# Patient Record
Sex: FEMALE | Race: WHITE | Employment: FULL TIME | URBAN - METROPOLITAN AREA
[De-identification: names, ages, dates, MRNs, and addresses within clinical notes are randomized per-mention and may not be internally consistent; named-entity substitution may affect disease eponyms.]

---

## 2017-08-24 ENCOUNTER — HOSPITAL ENCOUNTER (OUTPATIENT)
Dept: PHYSICAL THERAPY | Age: 32
Discharge: HOME | End: 2017-08-24
Payer: COMMERCIAL

## 2017-08-24 PROCEDURE — 97161 PT EVAL LOW COMPLEX 20 MIN: CPT

## 2017-08-24 NOTE — PROGRESS NOTES
Connei Estrella  : 1985 [facility] at [address]  [phone]   [phone]         OUTPATIENT PHYSICAL THERAPY:Initial Assessment 2017    ICD-10: Treatment Diagnosis: Difficulty in walking, not elsewhere classified (R26.2)  Precautions/Allergies:   Review of patient's allergies indicates not on file. none per patient report   Fall Risk Score: 0 (? 5 = High Risk)  MD Orders: evaluate and treat MEDICAL/REFERRING DIAGNOSIS:  Mononeuropathy, unspecified [G58.9]  Muscle weakness (generalized) [M62.81]   DATE OF ONSET: 2017  REFERRING PHYSICIAN: Dr. Boston Hastings: none scheduled     ASSESSMENT (Date: 17):  Ms. Sis Lopez presents to physical therapy with an ankle injury back in April that has left her unable to bear weight through her RLE. She was recently at [facility] and they gave her a preliminary diagnosis of CRPS but they are still doing some blood work. She has full range of motion and functional strength in her RLE. She has resting pain of 2/10 that increases to 10/10 with any weight bearing. She currently uses a knee scooter to get around. She is able to get around with a walker but only bears 12# of weight through her RLE. She would benefit from skilled PT to improve her functional mobility and return to full weight bearing on her RLE. PROBLEM LIST (Impacting functional limitations):  1. Decreased ADL/Functional Activities  2. Decreased Ambulation Ability/Technique  3. Decreased Balance  4. Increased Pain  5. Decreased Activity Tolerance  6. Decreased [place] with Home Exercise Program INTERVENTIONS PLANNED:  1. Balance Exercise  2. Family Education  3. Gait Training  4. Home Exercise Program (HEP)  5. Manual Therapy  6. Neuromuscular Re-education/Strengthening  7. Therapeutic Activites  8.  Therapeutic Exercise/Strengthening   TREATMENT PLAN:  Effective Dates: 17 TO 10/24/17. Frequency/Duration: 2 times a week for 2 weeks  GOALS: (Goals have been discussed and agreed upon with patient.)  Discharge Functional Goals: Time Frame: 8 weeks  1. Patient will be compliant with HEP. 2. Patient will have a decrease on the TUG to 15 seconds in order to improve her functional mobility. 3. Patient will have an increase on the FAAM to 70 indicating a return to her PLOF. 4. Patient will demonstrate ambulating with 75% weight bearing indicating a return to normal function. Rehabilitation Potential For Stated Goals: Good  Regarding Fozia Centeno's therapy, I certify that the treatment plan above will be carried out by a therapist or under their direction. Thank you for this referral,  Shashi Aaron DPT, NCS     Referring Physician Signature: Dr. Hugo Estrada               Date                    HISTORY:   Patient Stated Goal:        I want to be back to normal   History of Present Injury/Illness (Reason for Referral):  Patient injured R foot playing kickball in April. She did therapy in [facility] but it didn't help. She did get ROM and strength back but wasn't able to ambulate. She went to [facility] clinic for 16 days. They are still running test but it may be complex regional pain syndrome. She got therapy there that was helpful. She has a rollator and a walker. She is using the walker minimal at home. Current pain 2/10.  24 hour pain level 10/10. Past Medical History/Comorbidities:   Ms. Ariadna Buckner  has no past medical history on file. Ms. Rosenthal Born  has no past surgical history on file. no significant history. Social History/Living Environment:     lives with boyfriend. 2 story home with an elevator. Prior Level of Function/Work/Activity:  Independent. Consultant, desk work.      Current Medications:  See list in paper chart    Date Last Reviewed:  8/24/2017   Number of Personal Factors/Comorbidities that affect the Plan of Care: 0: LOW COMPLEXITY   EXAMINATION: Observation/Orthostatic Postural Assessment:          Patient standing with weight shifted to the L, very minimal weight on the R.  Limited heel contact on the R, R hip elevated to keep foot off the ground. Able to stand with 12# of pressure on the R foot in static standing      Mental Status:          WFL  Palpation:          Cold to the touch, no tenderness to touch  Sensation:         Intact light tough and proprioception. Intact protective sensation   Skin Integrity:          R foot and ankle purple and cold to the touch. Vision:          functional  Special Tests:          None perfomed  Balance:          Patient able to single leg balance on L, unable to wear any functional weight on the R     Lower Extremity:   Strength PROM   Action R L R  L    Hip Flexion 4+      Hip Extension 4+      Hip Abduction 4+      Hip Adduction 4+      Knee Flexion 4+      Knee Extension 5      Dorsi Flexion 4+      Plantar Flexion 4+      Inversion 4      Eversion 4                Upper Extremity:         Grossly 5/5  Functional Mobility:         Gait/Ambulation:  With rolling walker, step through gait pattern with shortened L step length, increased UE weight bearing. Decreased heel strike and push off on the R. Transfers:  Modified independent without weight bearing through RLE        Bed Mobility:  Modified independent        Stairs:  NT        Wheelchair:  NT                Body Structures Involved:  1. Nerves  2. Joints  3. Muscles Body Functions Affected:  1. Sensory/Pain  2. Neuromusculoskeletal  3. Movement Related Activities and Participation Affected:  1. Mobility  2. Domestic Life  3. Interpersonal Interactions and Relationships  4. Community, Social and [place]   Number of elements that affect the Plan of Care: 4+: HIGH COMPLEXITY   CLINICAL PRESENTATION:   Presentation: Stable and uncomplicated: LOW COMPLEXITY   CLINICAL DECISION MAKING:   Outcome Measure:    Tool Used: PT/OT FOOT AND ANKLE ABILITY MEASURE  Score:  Initial: 12/84, 12/116 Most Recent: X (Date: -- )   Interpretation of Score: For the \"Activities of Daily Living\", there are 21 questions each scored on a 5 point scale with 0 representing \"Unable to do\" and 4 representing \"No difficulty\". The lower the score, the greater the functional disability. 84/84 represents no disability. Minimal detectable change is 5.7 points. With the addition of the 8 questions in the \"Sports Subscale,\" there are 29 questions, each scored on a 5 point scale with 0 representing \"Unable to do\" and 4 representing \"No difficulty\". The lower the score, the greater the functional disability. 116/116 represents no disability. Minimal detectable change is 12.3 points. Activities of Daily Living:  Score 84 83-68 67-51 50-34 33-18 17-1 0   Modifier CH CI CJ CK CL CM CN     Activities of Daily Living + Sports Subscale:  Score 116 115-94 93-71 70-47 46-24 23-1 0   Modifier CH CI CJ CK CL CM CN         Tool Used: Timed Up and Go (TUG)  Score:  Initial: 33.6 seconds Most Recent: X seconds (Date: -- )   Interpretation of Score: The test measures, in seconds, the time taken by an individual to stand up from a standard arm chair (seat height 46 cm [18 in], arm height 65 cm [25.6 in]), walk a distance of 3 meters (118 in, approx 10 ft), turn, walk back to the chair and sit down. If the individual takes longer than 14 seconds to complete TUG, this indicates risk for falls. Score 7 7.5-10.5 11-14 14.5-17.5 18-21 21.5-24.5 25+   Modifier CH CI CJ CK CL CM CN         Medical Necessity:   · Patient is expected to demonstrate progress in balance and functional technique to increase independence with ADLs and IADLs. · Patient demonstrates good rehab potential due to higher previous functional level. Reason for Services/Other Comments:  · Patient continues to require modification of therapeutic interventions to increase complexity of exercises.    Use of outcome tool(s) and clinical judgement create a POC that gives a: Questionable prediction of patient's progress: MODERATE COMPLEXITY   TREATMENT:   (In addition to Assessment/Re-Assessment sessions the following treatments were rendered)  Pre Treatment Symptoms: see above    Assessment only today, no treatment provided. Treatment/Session Assessment:  See assessment above. · Pain/ Symptoms: Initial:   0/10 Post Session:  0/10 ·   Compliance with Program/Exercises: Will assess as treatment progresses. · Recommendations/Intent for next treatment session: \"Next visit will focus on advancements to more challenging activities and reduction in assistance provided\".   Total Treatment Duration:PT Patient Time In/Time Out  Time In: 0930  Time Out: Democr[place] 0106, DPT

## 2017-08-25 NOTE — PROGRESS NOTES
Ambulatory/Rehab Services H2 Model Falls Risk Assessment    Risk Factor Pts. ·   Confusion/Disorientation/Impulsivity  []    4 ·   Symptomatic Depression  []   2 ·   Altered Elimination  []   1 ·   Dizziness/Vertigo  []   1 ·   Gender (Male)  []   1 ·   Any administered antiepileptics (anticonvulsants):  []   2 ·   Any administered benzodiazepines:  []   1 ·   Visual Impairment (specify):  []   1 ·   Portable Oxygen Use  []   1 ·   Orthostatic ? BP  []   1 ·   History of Recent Falls (within 3 mos.)  []   5     Ability to Rise from Chair (choose one) Pts. ·   Ability to rise in a single movement  [x]   0 ·   Pushes up, successful in one attempt  []   1 ·   Multiple attempts, but successful  []   3 ·   Unable to rise without assistance  []   4   Total: (5 or greater = High Risk) 0     Falls Prevention Plan:   []                Physical Limitations to Exercise (specify):   []                Mobility Assistance Device (type):   []                Exercise/Equipment Adaptation (specify):    ©2010 [facility] [address] [place] [address] Patent #[id number].  Federal Law prohibits the replication, distribution or use without written permission from [facility]

## 2017-08-28 ENCOUNTER — APPOINTMENT (OUTPATIENT)
Dept: PHYSICAL THERAPY | Age: 32
End: 2017-08-28
Payer: COMMERCIAL

## 2017-08-29 ENCOUNTER — HOSPITAL ENCOUNTER (OUTPATIENT)
Dept: PHYSICAL THERAPY | Age: 32
Discharge: HOME | End: 2017-08-29
Payer: COMMERCIAL

## 2017-08-29 PROCEDURE — 97110 THERAPEUTIC EXERCISES: CPT

## 2017-08-29 NOTE — PROGRESS NOTES
Connie Estrella  : 1985 [facility] at [address]  [phone]   [phone]         OUTPATIENT PHYSICAL THERAPY:Daily Note 2017    ICD-10: Treatment Diagnosis: Difficulty in walking, not elsewhere classified (R26.2)  Precautions/Allergies:   Review of patient's allergies indicates not on file. none per patient report   Fall Risk Score: 0 (? 5 = High Risk)  MD Orders: evaluate and treat MEDICAL/REFERRING DIAGNOSIS:  Mononeuropathy, unspecified [G58.9]  Muscle weakness (generalized) [M62.81]   DATE OF ONSET: 2017  REFERRING PHYSICIAN: Dr. Boston Hastings: none scheduled     ASSESSMENT (Date: 17):  Ms. Sis Lopez presents to physical therapy with an ankle injury back in April that has left her unable to bear weight through her RLE. She was recently at [facility] and they gave her a preliminary diagnosis of CRPS but they are still doing some blood work. She has full range of motion and functional strength in her RLE. She has resting pain of 2/10 that increases to 10/10 with any weight bearing. She currently uses a knee scooter to get around. She is able to get around with a walker but only bears 12# of weight through her RLE. She would benefit from skilled PT to improve her functional mobility and return to full weight bearing on her RLE. PROBLEM LIST (Impacting functional limitations):  1. Decreased ADL/Functional Activities  2. Decreased Ambulation Ability/Technique  3. Decreased Balance  4. Increased Pain  5. Decreased Activity Tolerance  6. Decreased [place] with Home Exercise Program INTERVENTIONS PLANNED:  1. Balance Exercise  2. Family Education  3. Gait Training  4. Home Exercise Program (HEP)  5. Manual Therapy  6. Neuromuscular Re-education/Strengthening  7. Therapeutic Activites  8. Therapeutic Exercise/Strengthening   TREATMENT PLAN:  Effective Dates: 17 TO 10/24/17. Frequency/Duration: 2 times a week for 2 weeks  GOALS: (Goals have been discussed and agreed upon with patient.)  Discharge Functional Goals: Time Frame: 8 weeks  1. Patient will be compliant with HEP. 2. Patient will have a decrease on the TUG to 15 seconds in order to improve her functional mobility. 3. Patient will have an increase on the FAAM to 70 indicating a return to her PLOF. 4. Patient will demonstrate ambulating with 75% weight bearing indicating a return to normal function. Rehabilitation Potential For Stated Goals: Good  Regarding Arturoangel Billskayleen Centeno's therapy, I certify that the treatment plan above will be carried out by a therapist or under their direction. Thank you for this referral,  Cindy Mendiola, ZHAOT, NCS     Referring Physician Signature: Dr. Ivory Chaudhari               Date                    HISTORY:   Patient Stated Goal:        I want to be back to normal   History of Present Injury/Illness (Reason for Referral):  Patient injured R foot playing kickball in April. She did therapy in [facility] but it didn't help. She did get ROM and strength back but wasn't able to ambulate. She went to [facility] clinic for 16 days. They are still running test but it may be complex regional pain syndrome. She got therapy there that was helpful. She has a rollator and a walker. She is using the walker minimal at home. Current pain 2/10.  24 hour pain level 10/10. Past Medical History/Comorbidities:   Ms. Maryanne De La O  has no past medical history on file. Ms. Maryanne De La O  has no past surgical history on file. no significant history. Social History/Living Environment:     lives with boyfriend. 2 story home with an elevator. Prior Level of Function/Work/Activity:  Independent. Consultant, desk work.      Current Medications:  See list in paper chart    Date Last Reviewed:  8/29/2017   Number of Personal Factors/Comorbidities that affect the Plan of Care: 0: LOW COMPLEXITY   EXAMINATION:   Observation/Orthostatic Postural Assessment:          Patient standing with weight shifted to the L, very minimal weight on the R.  Limited heel contact on the R, R hip elevated to keep foot off the ground. Able to stand with 12# of pressure on the R foot in static standing      Mental Status:          WFL  Palpation:          Cold to the touch, no tenderness to touch  Sensation:         Intact light tough and proprioception. Intact protective sensation   Skin Integrity:          R foot and ankle purple and cold to the touch. Vision:          functional  Special Tests:          None perfomed  Balance:          Patient able to single leg balance on L, unable to wear any functional weight on the R     Lower Extremity:   Strength PROM   Action R L R  L    Hip Flexion 4+      Hip Extension 4+      Hip Abduction 4+      Hip Adduction 4+      Knee Flexion 4+      Knee Extension 5      Dorsi Flexion 4+      Plantar Flexion 4+      Inversion 4      Eversion 4                Upper Extremity:         Grossly 5/5  Functional Mobility:         Gait/Ambulation:  With rolling walker, step through gait pattern with shortened L step length, increased UE weight bearing. Decreased heel strike and push off on the R. Transfers:  Modified independent without weight bearing through RLE        Bed Mobility:  Modified independent        Stairs:  NT        Wheelchair:  NT                Body Structures Involved:  1. Nerves  2. Joints  3. Muscles Body Functions Affected:  1. Sensory/Pain  2. Neuromusculoskeletal  3. Movement Related Activities and Participation Affected:  1. Mobility  2. Domestic Life  3. Interpersonal Interactions and Relationships  4. Community, Social and [place]   Number of elements that affect the Plan of Care: 4+: HIGH COMPLEXITY   CLINICAL PRESENTATION:   Presentation: Stable and uncomplicated: LOW COMPLEXITY   CLINICAL DECISION MAKING:   Outcome Measure:    Tool Used: PT/OT FOOT AND ANKLE ABILITY MEASURE  Score:  Initial: 12/84, 12/116 Most Recent: X (Date: -- )   Interpretation of Score: For the \"Activities of Daily Living\", there are 21 questions each scored on a 5 point scale with 0 representing \"Unable to do\" and 4 representing \"No difficulty\". The lower the score, the greater the functional disability. 84/84 represents no disability. Minimal detectable change is 5.7 points. With the addition of the 8 questions in the \"Sports Subscale,\" there are 29 questions, each scored on a 5 point scale with 0 representing \"Unable to do\" and 4 representing \"No difficulty\". The lower the score, the greater the functional disability. 116/116 represents no disability. Minimal detectable change is 12.3 points. Activities of Daily Living:  Score 84 83-68 67-51 50-34 33-18 17-1 0   Modifier CH CI CJ CK CL CM CN     Activities of Daily Living + Sports Subscale:  Score 116 115-94 93-71 70-47 46-24 23-1 0   Modifier CH CI CJ CK CL CM CN         Tool Used: Timed Up and Go (TUG)  Score:  Initial: 33.6 seconds Most Recent: X seconds (Date: -- )   Interpretation of Score: The test measures, in seconds, the time taken by an individual to stand up from a standard arm chair (seat height 46 cm [18 in], arm height 65 cm [25.6 in]), walk a distance of 3 meters (118 in, approx 10 ft), turn, walk back to the chair and sit down. If the individual takes longer than 14 seconds to complete TUG, this indicates risk for falls. Score 7 7.5-10.5 11-14 14.5-17.5 18-21 21.5-24.5 25+   Modifier CH CI CJ CK CL CM CN         Medical Necessity:   · Patient is expected to demonstrate progress in balance and functional technique to increase independence with ADLs and IADLs. · Patient demonstrates good rehab potential due to higher previous functional level. Reason for Services/Other Comments:  · Patient continues to require modification of therapeutic interventions to increase complexity of exercises.    Use of outcome tool(s) and clinical judgement create a POC that gives a: Questionable prediction of patient's progress: MODERATE COMPLEXITY   TREATMENT:   (In addition to Assessment/Re-Assessment sessions the following treatments were rendered)  Pre Treatment Symptoms: patient reports 1/10 pain prior to treatment. Therapeutic Exercise: ( 40 minutes):  Exercises per grid below to improve mobility, strength and balance. Required moderate visual, verbal and tactile cues to promote proper body alignment and promote proper body mechanics. Progressed complexity of movement as indicated. Date:  8/29/17 Date:   Date:     Activity/Exercise Parameters Parameters Parameters   Bike X 5 minutes level 1 with minimal push through R leg     Neuro-COM for weight bearing  Lateral weight bearing maintaining 20% holding to walker. Attempted to march LLE, weight bearing briefly jumped to 50-60% on R  A/P weight bearing while maintaining 20% on R, only able to get 40% anterior      4 way ankle R hip 2 x 15 reps yellow band     4 way hip R leg x 10 reps yellow band     Weight bearing through reverse step  X 20 reps R                         Treatment/Session Assessment:  Patient did well with exercises and was noted to be able to tolerate some increased levels of weight bearing at times. She was able to consistently bear 20% of body weight with some periods of more weight. She continues to benefit from skilled PT to improve functional mobility and balance. · Pain/ Symptoms: Initial:   1/10 Post Session:  2/10 ·   Compliance with Program/Exercises: Will assess as treatment progresses. · Recommendations/Intent for next treatment session: \"Next visit will focus on advancements to more challenging activities and reduction in assistance provided\".   Total Treatment Duration:  PT Patient Time In/Time Out  Time In: 0930  Time Out: [facility] ELISHA Holloway

## 2017-09-01 ENCOUNTER — HOSPITAL ENCOUNTER (OUTPATIENT)
Dept: PHYSICAL THERAPY | Age: 32
Discharge: HOME | End: 2017-09-01
Payer: COMMERCIAL

## 2017-09-01 PROCEDURE — 97110 THERAPEUTIC EXERCISES: CPT

## 2017-09-01 NOTE — PROGRESS NOTES
Iveth Butler  : 1985 Therapy Center at [address], [facility], [address] [facility]  [phone]   [phone]         OUTPATIENT PHYSICAL THERAPY:Daily Note 2017    ICD-10: Treatment Diagnosis: Difficulty in walking, not elsewhere classified (R26.2)  Precautions/Allergies:   Review of patient's allergies indicates not on file. none per patient report   Fall Risk Score: 0 (? 5 = High Risk)  MD Orders: evaluate and treat MEDICAL/REFERRING DIAGNOSIS:  Mononeuropathy, unspecified [G58.9]  Muscle weakness (generalized) [M62.81]   DATE OF ONSET: 2017  REFERRING PHYSICIAN: Dr. Jaynie Canavan: none scheduled     ASSESSMENT (Date: 17):  Ms. Zayda Pacheco presents to physical therapy with an ankle injury back in April that has left her unable to bear weight through her RLE. She was recently at [facility] and they gave her a preliminary diagnosis of CRPS but they are still doing some blood work. She has full range of motion and functional strength in her RLE. She has resting pain of 2/10 that increases to 10/10 with any weight bearing. She currently uses a knee scooter to get around. She is able to get around with a walker but only bears 12# of weight through her RLE. She would benefit from skilled PT to improve her functional mobility and return to full weight bearing on her RLE. PROBLEM LIST (Impacting functional limitations):  1. Decreased ADL/Functional Activities  2. Decreased Ambulation Ability/Technique  3. Decreased Balance  4. Increased Pain  5. Decreased Activity Tolerance  6. Decreased [place] with Home Exercise Program INTERVENTIONS PLANNED:  1. Balance Exercise  2. Family Education  3. Gait Training  4. Home Exercise Program (HEP)  5. Manual Therapy  6. Neuromuscular Re-education/Strengthening  7. Therapeutic Activites  8. Therapeutic Exercise/Strengthening   TREATMENT PLAN:  Effective Dates: 17 TO 10/24/17. Frequency/Duration: 2 times a week for 2 weeks  GOALS: (Goals have been discussed and agreed upon with patient.)  Discharge Functional Goals: Time Frame: 8 weeks  1. Patient will be compliant with HEP. 2. Patient will have a decrease on the TUG to 15 seconds in order to improve her functional mobility. 3. Patient will have an increase on the FAAM to 70 indicating a return to her PLOF. 4. Patient will demonstrate ambulating with 75% weight bearing indicating a return to normal function. Rehabilitation Potential For Stated Goals: Good  Regarding Rashaun Centeno's therapy, I certify that the treatment plan above will be carried out by a therapist or under their direction. Thank you for this referral,  ZHAO LeslieT, NCS     Referring Physician Signature: Dr. Cortney Jean-Baptiste               Date                    HISTORY:   Patient Stated Goal:        I want to be back to normal   History of Present Injury/Illness (Reason for Referral):  Patient injured R foot playing kickball in April. She did therapy in [facility] but it didn't help. She did get ROM and strength back but wasn't able to ambulate. She went to [facility] clinic for 16 days. They are still running test but it may be complex regional pain syndrome. She got therapy there that was helpful. She has a rollator and a walker. She is using the walker minimal at home. Current pain 2/10.  24 hour pain level 10/10. Past Medical History/Comorbidities:   Ms. Lynne Del Valle  has no past medical history on file. Ms. Lynne Del Valle  has no past surgical history on file. no significant history. Social History/Living Environment:     lives with boyfriend. 2 story home with an elevator. Prior Level of Function/Work/Activity:  Independent. Consultant, desk work.      Current Medications:  See list in paper chart    Date Last Reviewed:  9/1/2017   Number of Personal Factors/Comorbidities that affect the Plan of Care: 0: LOW COMPLEXITY   EXAMINATION:   Observation/Orthostatic Postural Assessment:          Patient standing with weight shifted to the L, very minimal weight on the R.  Limited heel contact on the R, R hip elevated to keep foot off the ground. Able to stand with 12# of pressure on the R foot in static standing      Mental Status:          WFL  Palpation:          Cold to the touch, no tenderness to touch  Sensation:         Intact light tough and proprioception. Intact protective sensation   Skin Integrity:          R foot and ankle purple and cold to the touch. Vision:          functional  Special Tests:          None perfomed  Balance:          Patient able to single leg balance on L, unable to wear any functional weight on the R     Lower Extremity:   Strength PROM   Action R L R  L    Hip Flexion 4+      Hip Extension 4+      Hip Abduction 4+      Hip Adduction 4+      Knee Flexion 4+      Knee Extension 5      Dorsi Flexion 4+      Plantar Flexion 4+      Inversion 4      Eversion 4                Upper Extremity:         Grossly 5/5  Functional Mobility:         Gait/Ambulation:  With rolling walker, step through gait pattern with shortened L step length, increased UE weight bearing. Decreased heel strike and push off on the R. Transfers:  Modified independent without weight bearing through RLE        Bed Mobility:  Modified independent        Stairs:  NT        Wheelchair:  NT                Body Structures Involved:  1. Nerves  2. Joints  3. Muscles Body Functions Affected:  1. Sensory/Pain  2. Neuromusculoskeletal  3. Movement Related Activities and Participation Affected:  1. Mobility  2. Domestic Life  3. Interpersonal Interactions and Relationships  4. Community, Social and [place]   Number of elements that affect the Plan of Care: 4+: HIGH COMPLEXITY   CLINICAL PRESENTATION:   Presentation: Stable and uncomplicated: LOW COMPLEXITY   CLINICAL DECISION MAKING:   Outcome Measure:    Tool Used: PT/OT FOOT AND ANKLE ABILITY MEASURE  Score:  Initial: 12/84, 12/116 Most Recent: X (Date: -- )   Interpretation of Score: For the \"Activities of Daily Living\", there are 21 questions each scored on a 5 point scale with 0 representing \"Unable to do\" and 4 representing \"No difficulty\". The lower the score, the greater the functional disability. 84/84 represents no disability. Minimal detectable change is 5.7 points. With the addition of the 8 questions in the \"Sports Subscale,\" there are 29 questions, each scored on a 5 point scale with 0 representing \"Unable to do\" and 4 representing \"No difficulty\". The lower the score, the greater the functional disability. 116/116 represents no disability. Minimal detectable change is 12.3 points. Activities of Daily Living:  Score 84 83-68 67-51 50-34 33-18 17-1 0   Modifier CH CI CJ CK CL CM CN     Activities of Daily Living + Sports Subscale:  Score 116 115-94 93-71 70-47 46-24 23-1 0   Modifier CH CI CJ CK CL CM CN         Tool Used: Timed Up and Go (TUG)  Score:  Initial: 33.6 seconds Most Recent: X seconds (Date: -- )   Interpretation of Score: The test measures, in seconds, the time taken by an individual to stand up from a standard arm chair (seat height 46 cm [18 in], arm height 65 cm [25.6 in]), walk a distance of 3 meters (118 in, approx 10 ft), turn, walk back to the chair and sit down. If the individual takes longer than 14 seconds to complete TUG, this indicates risk for falls. Score 7 7.5-10.5 11-14 14.5-17.5 18-21 21.5-24.5 25+   Modifier CH CI CJ CK CL CM CN         Medical Necessity:   · Patient is expected to demonstrate progress in balance and functional technique to increase independence with ADLs and IADLs. · Patient demonstrates good rehab potential due to higher previous functional level. Reason for Services/Other Comments:  · Patient continues to require modification of therapeutic interventions to increase complexity of exercises.    Use of outcome tool(s) and clinical judgement create a POC that gives a: Questionable prediction of patient's progress: MODERATE COMPLEXITY   TREATMENT:   (In addition to Assessment/Re-Assessment sessions the following treatments were rendered)  Pre Treatment Symptoms: patient reports 1/10 pain prior to treatment. She reports she was finally given a diagnosis. 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 She is going to return to [facility] in November for a surgery consultation. She reports that the MD told her not to be aggressive with therapy because he doesn't want her brain to be trained that walking is painful. Therapeutic Exercise: ( 40 minutes):  Exercises per grid below to improve mobility, strength and balance. Required moderate visual, verbal and tactile cues to promote proper body alignment and promote proper body mechanics. Progressed complexity of movement as indicated. Date:  8/29/17 Date:  9/1/17 Date:     Activity/Exercise Parameters Parameters Parameters   Bike X 5 minutes level 1 with minimal push through R leg  X 8 minutes level 1    Neuro-COM for weight bearing  Lateral weight bearing maintaining 20% holding to walker. Attempted to march LLE, weight bearing briefly jumped to 50-60% on R  A/P weight bearing while maintaining 20% on R, only able to get 40% anterior      4 way ankle R hip 2 x 15 reps yellow band     4 way hip R leg x 10 reps yellow band     Weight bearing through reverse step  X 20 reps R                 9/1/17: All exercises x 10 reps     Supine hip flexion  Prone hip extension  Sidelying hip abduction  Bridges  Clams   Seated HS curl with green band  Seated LAQ with green band  Standing HS curl  gastroc stretch on wall x 30 sec  Soleus stretch on wall x 30 sec      Treatment/Session Assessment:  Patient was educated on a detailed HEP to address ankle/knee/hip strength and ROM as patient awaits to have surgery.   The patient reported that the MD said not to be aggressive with PT. Therefore, patient will work on her HEP and return to therapy if needed. She was also educated on how to advance her gait and her exercises when ready. Patient and boyfriend verbalized understanding. · Pain/ Symptoms: Initial:   1/10 Post Session:  1/10 ·   Compliance with Program/Exercises: Will assess as treatment progresses. · Recommendations/Intent for next treatment session: \"Next visit will focus on advancements to more challenging activities and reduction in assistance provided\".   Total Treatment Duration:  PT Patient Time In/Time Out  Time In: 0910  Time Out: Marco Antonio Vanegas 94, DPT

## 2017-09-06 ENCOUNTER — APPOINTMENT (OUTPATIENT)
Dept: PHYSICAL THERAPY | Age: 32
End: 2017-09-06
Payer: COMMERCIAL

## 2017-09-08 ENCOUNTER — APPOINTMENT (OUTPATIENT)
Dept: PHYSICAL THERAPY | Age: 32
End: 2017-09-08
Payer: COMMERCIAL

## 2017-10-11 ENCOUNTER — HOSPITAL ENCOUNTER (OUTPATIENT)
Dept: PHYSICAL THERAPY | Age: 32
Discharge: HOME | End: 2017-10-11

## 2017-10-11 NOTE — PROGRESS NOTES
Pipo Nguyen  : 1985  Payor: Sophie Oscar / Plan: [place] [facility] / Product Type: PPO /  [facility] [facility], [place] [address] [address] [facility]  [phone]   [phone]        OUTPATIENT DAILY NOTE    NAME/AGE/GENDER: Pipo Nguyen is a 34 y.o. female. DATE: 10/11/2017    Patient showed for appointment today with questions regarding gait training with walking cast and home exercise program. Ambulated with rolling walker and crutches this date. She notes decreased pain in right foot with crutches but decreased feeling of stability. Educated on stair negotiation with use of crutches. Educated on progression of hip strengthening exercises. She verbalized and demonstrated understanding of gait training and home exercise program. No charge this date.      Yolie Salgado, PT

## 2018-01-02 ENCOUNTER — HOSPITAL ENCOUNTER (OUTPATIENT)
Dept: PHYSICAL THERAPY | Age: 33
Discharge: HOME | End: 2018-01-02
Payer: COMMERCIAL

## 2018-01-02 PROCEDURE — 97162 PT EVAL MOD COMPLEX 30 MIN: CPT

## 2018-01-02 PROCEDURE — 97110 THERAPEUTIC EXERCISES: CPT

## 2018-01-02 NOTE — THERAPY EVALUATION
Cuate Vargas  : 1985 Cuate Vargas  : 1985  Payor: Dirk List / [place] [employer] [address] / Product Type: PPO /  [address] [place]  at [facility]  [address], [address], [place] [address] [address] [facility]  [phone]   [phone]              OUTPATIENT PHYSICAL THERAPY:Initial Assessment 1/3/2018    ICD-10: Treatment Diagnosis: pain in right foot (M79.671)  Difficulty in walking, not elsewhere classified (R26.2)  Precautions/Allergies:   None per Cuate Vargas   Fall Risk Score: 1 (? 5 = High Risk)  MD Orders: Evaluate and treat MEDICAL/REFERRING DIAGNOSIS:  Pain, foot, right, chronic [M79.671, G89.29]  DATE OF ONSET: date of surgery 17  REFERRING PHYSICIAN: Jeb Crystal MD; Bhaskar Zepeda MD  RETURN PHYSICIAN APPOINTMENT: none schedule at this time   Patient has attended 1 physical therapy session including initial evaluation. INITIAL ASSESSMENT:  Ms. Shaina Daly presents with increased pain right foot and difficulty walking status post tarsal tunnel decompression on 17 with the [facility]. She had an ankle injury in 2017 that left her unable to bear weight through her right lower extremity. [facility] gave her a preliminary diagnosis of CRPS but then performed tarsal tunnel decompression right. She is cleared for weight bearing as tolerated status post procedure. She currently uses a knee scooter to get around. She holds her foot in dorsiflexed and inverted position. She does not like to touch her foot to floor. She presents with decreased range of motion right foot and ankle. She presents with decreased independence with functional mobility and activities of daily living. She would benefit from skilled physical therapy in order to restore prior level of function and prevent future impairment. PROBLEM LIST (Impacting functional limitations):  1. Decreased Strength  2. Decreased ADL/Functional Activities  3.  Decreased Transfer Abilities  4. Decreased Ambulation Ability/Technique  5. Decreased Balance  6. Increased Pain  7. Decreased Activity Tolerance  8. Decreased Pacing Skills  9. Decreased Work Simplification/Energy Conservation Techniques  10. Decreased Flexibility/Joint Mobility  11. Edema/Girth  12. Decreased [place] with Home Exercise Program INTERVENTIONS PLANNED:  1. Balance Exercise  2. Cold  3. Family Education  4. Gait Training  5. Heat  6. Home Exercise Program (HEP)  7. Manual Therapy  8. Neuromuscular Re-education/Strengthening  9. Range of Motion (ROM)  10. Therapeutic Activites  11. Therapeutic Exercise/Strengthening  12. Transcutaneous Electrical Nerve Stimulation (TENS)  13. Transfer Training  14. Ultrasound (US)  15. orthotic management and training   TREATMENT PLAN:  Effective Dates: 1/2/18 TO 4/2/18. Frequency/Duration: 2 times a week for 8 weeks  GOALS: (Goals have been discussed and agreed upon with patient.)  Discharge Goals: Time Frame: 8 weeks  1. Patient will be independent with home exercise program within 8 weeks in order to improve independence with management of patient's symptoms and/or functional deficits. 2. Patient will improve their foot and ankle ability measure score to 20/84 within 8 weeks in order to improve pain free independence with functional mobility. 3. Patient will ambulate with equal weight bearing with use of least restrictive device within 8 weeks in order to improve pain free independence with functional mobility. Rehabilitation Potential For Stated Goals: Good  Regarding Alice Centeno's therapy, I certify that the treatment plan above will be carried out by a therapist or under their direction. Thank you for this referral,  Kelsi Cazares PT     Referring Physician Signature:               Date                    The information in this section was collected on 1/2/18 (except where otherwise noted).   HISTORY:   History of Present Injury/Illness (Reason for Referral):  Ms. Geoff Elena presents with increased pain right foot and difficulty walking status post tarsal tunnel decompression on 12/7/17 with the [facility]. She had an ankle injury in April 2017 that left her unable to bear weight through her right lower extremity. [facility] gave her a preliminary diagnosis of CRPS but then performed tarsal tunnel decompression right. She is cleared for weight bearing as tolerated status post procedure. She currently uses a knee scooter to get around. She holds her foot in dorsiflexed and inverted position. She does not like to touch her foot to floor. 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 Current Medications: vuvanse sonata, birth control, advil, tylenol    Date Last Reviewed:  1/3/2018   Number of Personal Factors/Comorbidities that affect the Plan of Care: 1-2: MODERATE COMPLEXITY   EXAMINATION:   Observation/Orthostatic Postural Assessment:  Assessed 1/2/18 Jeremy Garcia sits with forward head and rounded shoulders which indicate tight anterior chest musculature, upper trapezius, and levator scapula and weak posterior scapula musculature and deep cervical flexors. She holds her foot in dorsiflexed and inverted position.  She does not touch foot to floor when sitting  Palpation:  Assessed 1/2/18         Jeremy Garcia notes tenderness with palpation right plantar fascia, decreased myofascial mobility right foot/ankle and surrounding incision, increased edema right ankle/foot  ROM:  Assessed 1/2/18  Left ankle dorsiflexion active range of motion with knee flexed: 10 degrees  Left ankle dorsiflexion active range of motion with knee extended: 10 degrees  Left ankle dorsiflexion passive range of motion with knee extended: 20 degrees  Left ankle plantarflexion active range of motion: 35 degrees    Right ankle dorsiflexion active range of motion with knee flexed: 10 degrees  Rightt ankle dorsiflexion active range of motion with knee extended: 10 degrees  Right ankle dorsiflexion passive range of motion with knee extended: 12 degrees  Rightt ankle plantarflexion active range of motion: 30 degrees  At least 30 degrees of ankle inversion/eversion passive range of motion    Right first ray dorsiflexed with hypomobility plantarflexion   Strength:  Assessed 1/2/18         At least 3/5 strength right ankle - will continue to assess as appropriate  4+/5 left ankle and lower extremity all major muscle groups  Special Tests:    Neurological Screen: Assessed 1/2/18        Myotomes:  Strong left lower extremity, weak right ankle/foot due to recent procedure        Dermatomes: Altered sensation right foot/ankle        Reflexes:    Functional Mobility: Assessed 1/2/18 Marcos Viveros is modified independent with functional mobility and activities of daily living with use of scooter, she notes she has tried to shift weight to right lower extremity with use of rolling walker but has difficulty  Balance:  Assessed 1/2/17         Poor standing dynamic balance - fair standing dynamic balance with use of scooter  Mental Status:  Assessed 1/2/17        Alert and oriented x 4   Body Structures Involved:  1. Nerves  2. Bones  3. Joints  4. Muscles  5. Ligaments Body Functions Affected:  1. Sensory/Pain  2. Neuromusculoskeletal  3. Movement Related  4. Skin Related Activities and Participation Affected:  1. General Tasks and Demands  2. Mobility  3. Self Care  4. Domestic Life  5.  Interpersonal Interactions and Relationships  6. Community, Social and [place]   Number of elements that affect the Plan of Care: 3: MODERATE COMPLEXITY   CLINICAL PRESENTATION:   Presentation: Evolving clinical presentation with changing clinical characteristics: MODERATE COMPLEXITY   CLINICAL DECISION MAKING:   Outcome Measure: Assessed 1/2/18  Tool Used: PT/OT FOOT AND ANKLE ABILITY MEASURE  Score:  Initial: 1/84    Interpretation of Score: For the \"Activities of Daily Living\", there are 21 questions each scored on a 5 point scale with 0 representing \"Unable to do\" and 4 representing \"No difficulty\". The lower the score, the greater the functional disability. 84/84 represents no disability. Minimal detectable change is 5.7 points. With the addition of the 8 questions in the \"Sports Subscale,\" there are 29 questions, each scored on a 5 point scale with 0 representing \"Unable to do\" and 4 representing \"No difficulty\". The lower the score, the greater the functional disability. 116/116 represents no disability. Minimal detectable change is 12.3 points. Activities of Daily Living:  Score 84 83-68 67-51 50-34 33-18 17-1 0   Modifier CH CI CJ CK CL CM CN     Medical Necessity:   · Patient is expected to demonstrate progress in strength, range of motion, balance, coordination and functional technique to increase independence with pain free functional mobility. · Patient demonstrates good rehab potential due to higher previous functional level. Reason for Services/Other Comments:  · Patient continues to require skilled intervention due to decreased independence with functional mobility.    Use of outcome tool(s) and clinical judgement create a POC that gives a: Questionable prediction of patient's progress: MODERATE COMPLEXITY            TREATMENT:   (In addition to Assessment/Re-Assessment sessions the following treatments were rendered)  Pre-treatment Symptoms/Complaints:  Abiola Roldan notes 2/10 pain at rest right foot, she notes pain increases to 8/10 if she attempts to bear weight right lower extremity   Pain: Initial:   Pain Intensity 1: 2  Pain Location 1: Ankle, Foot  Pain Orientation 1: Right      Evaluation: 31 minutes    (In addition to Assessment/Re-Assessment sessions the following treatments were rendered)  Therapeutic Exercise: (14 Minutes):  Exercises per grid below to improve mobility, strength and coordination. Required minimal visual and verbal cues to promote proper body alignment and promote proper body posture. Progressed range and complexity of movement as indicated. 1. Gentle active range of motion right for improved mobility and strength - ABC, dorsiflexion/plantarflexion, inversion/eversion, circles     2. Gentle passive dorsiflexion stretching with towel with foot inverted to reduce tibial nerve tension 3 sets 30 seconds right    3. Tibial nerve gliding technique with foot plantarflexed and inverted and short arc quads 20 repetitions right     4. Gentle weight bearing dorsiflexion stretch in sitting with toes on towel roll and foot inverted 3 sets 30 seconds right     Manual Therapy (     ):   Therapeutic Modalities:                                                                                                 Treatment/Session Assessment:    Response to Treatment:  Jr Hawkins tolerated initiation of home exercise program well this date with no increase in pain  Pain: Post Treatment Session: 2/10  · Compliance with Program/Exercises: Will assess as treatment progresses. · Recommendations/Intent for next treatment session: \"Next visit will focus on advancements to more challenging activities and aquatic therapy for improved weight bearing right lower extremity\".   Total Treatment Duration:  PT Patient Time In/Time Out  Time In: 1100  Time Out: Elias Zhang PT

## 2018-01-02 NOTE — PROGRESS NOTES
Ambulatory/Rehab Services H2 Model Falls Risk Assessment    Risk Factor Pts. ·   Confusion/Disorientation/Impulsivity  []    4 ·   Symptomatic Depression  []   2 ·   Altered Elimination  []   1 ·   Dizziness/Vertigo  []   1 ·   Gender (Male)  []   1 ·   Any administered antiepileptics (anticonvulsants):  []   2 ·   Any administered benzodiazepines:  []   1 ·   Visual Impairment (specify):  []   1 ·   Portable Oxygen Use  []   1 ·   Orthostatic ? BP  []   1 ·   History of Recent Falls (within 3 mos.)  []   5     Ability to Rise from Chair (choose one) Pts. ·   Ability to rise in a single movement  []   0 ·   Pushes up, successful in one attempt  [x]   1 ·   Multiple attempts, but successful  []   3 ·   Unable to rise without assistance  []   4   Total: (5 or greater = High Risk) 1     Falls Prevention Plan:   []                Physical Limitations to Exercise (specify):   []                Mobility Assistance Device (type):   []                Exercise/Equipment Adaptation (specify):    ©2010 [facility] [place] [address] [place] [address] Patent #[id number].  Federal Law prohibits the replication, distribution or use without written permission from [facility] of [address] [place] [address]  1/2/2018

## 2018-01-02 NOTE — PROGRESS NOTES
Tiesha Valdez  : 1985 [facility] [place] [address] [address] [facility]  [phone]   [phone]         OUTPATIENT PHYSICAL THERAPY:Discontinuation Summary 2018    ICD-10: Treatment Diagnosis: Difficulty in walking, not elsewhere classified (R26.2)  Precautions/Allergies:   Review of patient's allergies indicates not on file. none per patient report   Fall Risk Score: 0 (? 5 = High Risk)  MD Orders: evaluate and treat MEDICAL/REFERRING DIAGNOSIS:  Mononeuropathy, unspecified [G58.9]  Muscle weakness (generalized) [M62.81]   DATE OF ONSET: 2017  REFERRING PHYSICIAN: Dr. Petar Avila: none scheduled     ASSESSMENT (Date: ):  Tiesha Valdez has been seen in physical therapy from 17 to 17 for 3 visits. Treatment has been discontinued at this time due to patient doing an HEP and not needing to return to therapy. The below goals were met prior to discontinuation. Some goals were not met due to not being reassessed. Thank you for this referral.          PROBLEM LIST (Impacting functional limitations):  1. Decreased ADL/Functional Activities  2. Decreased Ambulation Ability/Technique  3. Decreased Balance  4. Increased Pain  5. Decreased Activity Tolerance  6. Decreased [place] with Home Exercise Program INTERVENTIONS PLANNED:  1. Balance Exercise  2. Family Education  3. Gait Training  4. Home Exercise Program (HEP)  5. Manual Therapy  6. Neuromuscular Re-education/Strengthening  7. Therapeutic Activites  8. Therapeutic Exercise/Strengthening   TREATMENT PLAN:  Effective Dates: 17 TO 10/24/17. Frequency/Duration: 2 times a week for 2 weeks  GOALS: (Goals have been discussed and agreed upon with patient.)  Discharge Functional Goals: Time Frame: 8 weeks  1. Patient will be compliant with HEP. MET  2.  Patient will have a decrease on the TUG to 15 seconds in order to improve her functional mobility. NOT ASSESSED  3. Patient will have an increase on the FAAM to 70 indicating a return to her PLOF. NOT ASSESSED  4. Patient will demonstrate ambulating with 75% weight bearing indicating a return to normal function. NOT ASSESSED  Rehabilitation Potential For Stated Goals: Good  Regarding Yamile Centeno's therapy, I certify that the treatment plan above will be carried out by a therapist or under their direction.   Thank you for this referral,  Diallo Ivy, ELISHA, NCS

## 2018-01-03 PROBLEM — F90.9 ATTENTION DEFICIT HYPERACTIVITY DISORDER (ADHD): Status: ACTIVE | Noted: 2018-01-03

## 2018-01-03 PROBLEM — G47.00 INSOMNIA: Status: ACTIVE | Noted: 2018-01-03

## 2018-01-03 PROBLEM — R03.0 WHITE COAT SYNDROME WITHOUT DIAGNOSIS OF HYPERTENSION: Status: ACTIVE | Noted: 2018-01-03

## 2018-01-03 PROBLEM — G57.51 TARSAL TUNNEL SYNDROME OF RIGHT SIDE: Status: ACTIVE | Noted: 2018-01-03

## 2018-01-04 ENCOUNTER — HOSPITAL ENCOUNTER (OUTPATIENT)
Dept: PHYSICAL THERAPY | Age: 33
Discharge: HOME | End: 2018-01-04
Payer: COMMERCIAL

## 2018-01-04 PROCEDURE — 97113 AQUATIC THERAPY/EXERCISES: CPT

## 2018-01-04 NOTE — PROGRESS NOTES
Anna Sleight  : 1985 Anna Sleight  : 1985  Payor: Aravind Perez / Plan: [place] BLUE CROSS OF [address] / Product Type: PPO /  [address]  at [facility]  [address] [facility], [address]  [phone]   [phone]              OUTPATIENT PHYSICAL THERAPY:Daily Note and Aquatic Note 2018    ICD-10: Treatment Diagnosis: pain in right foot (M79.671)  Difficulty in walking, not elsewhere classified (R26.2)  Precautions/Allergies:   None per Anna Caty   Fall Risk Score: 1 (? 5 = High Risk)  MD Orders: Evaluate and treat MEDICAL/REFERRING DIAGNOSIS:  Pain, foot, right, chronic [M79.671, G89.29]  DATE OF ONSET: date of surgery 17  REFERRING PHYSICIAN: Tomasa Charles MD; Jah Patel MD  RETURN PHYSICIAN APPOINTMENT: none schedule at this time   Patient has attended 1 physical therapy session including initial evaluation. INITIAL ASSESSMENT:  Ms. Tahmina Vega presents with increased pain right foot and difficulty walking status post tarsal tunnel decompression on 17 with the [facility]. She had an ankle injury in 2017 that left her unable to bear weight through her right lower extremity. [facility] gave her a preliminary diagnosis of CRPS but then performed tarsal tunnel decompression right. She is cleared for weight bearing as tolerated status post procedure. She currently uses a knee scooter to get around. She holds her foot in dorsiflexed and inverted position. She does not like to touch her foot to floor. She presents with decreased range of motion right foot and ankle. She presents with decreased independence with functional mobility and activities of daily living. She would benefit from skilled physical therapy in order to restore prior level of function and prevent future impairment. PROBLEM LIST (Impacting functional limitations):  1. Decreased Strength  2. Decreased ADL/Functional Activities  3.  Decreased Transfer Abilities  4. Decreased Ambulation Ability/Technique  5. Decreased Balance  6. Increased Pain  7. Decreased Activity Tolerance  8. Decreased Pacing Skills  9. Decreased Work Simplification/Energy Conservation Techniques  10. Decreased Flexibility/Joint Mobility  11. Edema/Girth  12. Decreased [place] with Home Exercise Program INTERVENTIONS PLANNED:  1. Balance Exercise  2. Cold  3. Family Education  4. Gait Training  5. Heat  6. Home Exercise Program (HEP)  7. Manual Therapy  8. Neuromuscular Re-education/Strengthening  9. Range of Motion (ROM)  10. Therapeutic Activites  11. Therapeutic Exercise/Strengthening  12. Transcutaneous Electrical Nerve Stimulation (TENS)  13. Transfer Training  14. Ultrasound (US)  15. orthotic management and training   TREATMENT PLAN:  Effective Dates: 1/2/18 TO 4/2/18. Frequency/Duration: 2 times a week for 8 weeks  GOALS: (Goals have been discussed and agreed upon with patient.)  Discharge Goals: Time Frame: 8 weeks  1. Patient will be independent with home exercise program within 8 weeks in order to improve independence with management of patient's symptoms and/or functional deficits. 2. Patient will improve their foot and ankle ability measure score to 20/84 within 8 weeks in order to improve pain free independence with functional mobility. 3. Patient will ambulate with equal weight bearing with use of least restrictive device within 8 weeks in order to improve pain free independence with functional mobility. Rehabilitation Potential For Stated Goals: Good  Regarding [facility]'s therapy, I certify that the treatment plan above will be carried out by a therapist or under their direction. Thank you for this referral,  Arik Altamirano PTA     Referring Physician Signature:               Date                    The information in this section was collected on 1/2/18 (except where otherwise noted).   HISTORY:   History of Present Injury/Illness (Reason for Referral):  Ms. Rere Hogan presents with increased pain right foot and difficulty walking status post tarsal tunnel decompression on 12/7/17 with the [facility]. She had an ankle injury in April 2017 that left her unable to bear weight through her right lower extremity. [facility] gave her a preliminary diagnosis of CRPS but then performed tarsal tunnel decompression right. She is cleared for weight bearing as tolerated status post procedure. She currently uses a knee scooter to get around. She holds her foot in dorsiflexed and inverted position. She does not like to touch her foot to floor. 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 Current Medications: vuvanse sonata, birth control, advil, tylenol    Date Last Reviewed:  1/4/2018   Number of Personal Factors/Comorbidities that affect the Plan of Care: 1-2: MODERATE COMPLEXITY   EXAMINATION:   Observation/Orthostatic Postural Assessment:  Assessed 1/2/18 Mark More sits with forward head and rounded shoulders which indicate tight anterior chest musculature, upper trapezius, and levator scapula and weak posterior scapula musculature and deep cervical flexors. She holds her foot in dorsiflexed and inverted position.  She does not touch foot to floor when sitting  Palpation:  Assessed 1/2/18         Mark More notes tenderness with palpation right plantar fascia, decreased myofascial mobility right foot/ankle and surrounding incision, increased edema right ankle/foot  ROM:  Assessed 1/2/18  Left ankle dorsiflexion active range of motion with knee flexed: 10 degrees  Left ankle dorsiflexion active range of motion with knee extended: 10 degrees  Left ankle dorsiflexion passive range of motion with knee extended: 20 degrees  Left ankle plantarflexion active range of motion: 35 degrees    Right ankle dorsiflexion active range of motion with knee flexed: 10 degrees  Rightt ankle dorsiflexion active range of motion with knee extended: 10 degrees  Right ankle dorsiflexion passive range of motion with knee extended: 12 degrees  Rightt ankle plantarflexion active range of motion: 30 degrees  At least 30 degrees of ankle inversion/eversion passive range of motion    Right first ray dorsiflexed with hypomobility plantarflexion   Strength:  Assessed 1/2/18         At least 3/5 strength right ankle - will continue to assess as appropriate  4+/5 left ankle and lower extremity all major muscle groups  Special Tests:    Neurological Screen: Assessed 1/2/18        Myotomes:  Strong left lower extremity, weak right ankle/foot due to recent procedure        Dermatomes: Altered sensation right foot/ankle        Reflexes:    Functional Mobility: Assessed 1/2/18 Cece Streeter is modified independent with functional mobility and activities of daily living with use of scooter, she notes she has tried to shift weight to right lower extremity with use of rolling walker but has difficulty  Balance:  Assessed 1/2/17         Poor standing dynamic balance - fair standing dynamic balance with use of scooter  Mental Status:  Assessed 1/2/17        Alert and oriented x 4   Body Structures Involved:  1. Nerves  2. Bones  3. Joints  4. Muscles  5. Ligaments Body Functions Affected:  1. Sensory/Pain  2. Neuromusculoskeletal  3. Movement Related  4. Skin Related Activities and Participation Affected:  1. General Tasks and Demands  2. Mobility  3. Self Care  4. Domestic Life  5.  Interpersonal Interactions and Relationships  6. Community, Social and [place]   Number of elements that affect the Plan of Care: 3: MODERATE COMPLEXITY   CLINICAL PRESENTATION:   Presentation: Evolving clinical presentation with changing clinical characteristics: MODERATE COMPLEXITY   CLINICAL DECISION MAKING:   Outcome Measure: Assessed 1/2/18  Tool Used: PT/OT FOOT AND ANKLE ABILITY MEASURE  Score:  Initial: 1/84    Interpretation of Score: For the \"Activities of Daily Living\", there are 21 questions each scored on a 5 point scale with 0 representing \"Unable to do\" and 4 representing \"No difficulty\". The lower the score, the greater the functional disability. 84/84 represents no disability. Minimal detectable change is 5.7 points. With the addition of the 8 questions in the \"Sports Subscale,\" there are 29 questions, each scored on a 5 point scale with 0 representing \"Unable to do\" and 4 representing \"No difficulty\". The lower the score, the greater the functional disability. 116/116 represents no disability. Minimal detectable change is 12.3 points. Activities of Daily Living:  Score 84 83-68 67-51 50-34 33-18 17-1 0   Modifier CH CI CJ CK CL CM CN     Medical Necessity:   · Patient is expected to demonstrate progress in strength, range of motion, balance, coordination and functional technique to increase independence with pain free functional mobility. · Patient demonstrates good rehab potential due to higher previous functional level. Reason for Services/Other Comments:  · Patient continues to require skilled intervention due to decreased independence with functional mobility.    Use of outcome tool(s) and clinical judgement create a POC that gives a: Questionable prediction of patient's progress: MODERATE COMPLEXITY            TREATMENT:   (In addition to Assessment/Re-Assessment sessions the following treatments were rendered)  Pre-treatment Symptoms/Complaints:  Ada Bernal notes 2/10 pain at rest in the right foot. Patient presents with scooter. Pain: Initial:     2/10 at rest      Aquatic Therapy ( 50  minutes): Aquatic treatment performed per flow grid for Decreased muscle strength, Decreased endurance and Decreased activity endurance. Cues provided for alignment and weight bearing. .  Assistance by therapist provided for patient to be able to weight bear on R LE. Aquatic Exercise Log       Date  1-4-18 Date   Date   Date   Date     Activity/ Exercise Parameters Parameters Parameters Parameters Parameters   Walking forward 2 laps with rail and therapist        Walking backward        Walking sideways In deeper water x 1 lap         Marching          Goose Step          Tip toes          Heels          Lunges        Side step squats        LE Exercises Small noodle          Hip Flex/Ext Marching x 10 B         Hip Abd/Add          Hip IR/ER          Calf raises          Knee Flex No noodle x 10 B         Squats          Leg Circles Deeper water x 10 each way each leg         Step Ups Small step x 10 B       UE Exercises          Squeeze In          Push Down          Pull Down          Bicep/Tricep        Rows/Press outs         Chi Positions          Trunk Rotation        Deep H2O/ Noodles noodle         Stabilization Core stability         Arms only          Legs only Bicycle- 2 laps       Cross   Country X 25          Scissors X 25  Combo x 20          Crab walk Seated on noodle in shallow - walking forward, backwards, and side stepping x 2 laps each        Lower abdominal   work  Knees to chest x 10          Cardio          Jogging        Lap   Swimming          Stretches On bench  *          Hamstrings X 3 B         Heelcords X 3 B         Piriformis X 3 B       R foot  By therapist                    * For increased soft tissue extensibility a warm water (over 100°F) environment was utilized. Supervision/monitoring was provided at all times.         Treatment/Session Assessment:  Patient was assisted out of pool at edge by boyfriend. I encouraged her to place her foot on the ground. Response to Treatment:  Umberto Martino tolerated initiation of home exercise program well this date with no increase in pain  Pain: Post Treatment Session: 4-5/10  · Compliance with Program/Exercises: Will assess as treatment progresses. · Recommendations/Intent for next treatment session: \"Next visit will focus on advancements to more challenging activities and aquatic therapy for improved weight bearing right lower extremity\".   Total Treatment Duration: 50 minutes   PT Patient Time In/Time Out  Time In: 0090  Time Out: [address] [place], [place]

## 2018-01-09 ENCOUNTER — HOSPITAL ENCOUNTER (OUTPATIENT)
Dept: PHYSICAL THERAPY | Age: 33
Discharge: HOME | End: 2018-01-09
Payer: COMMERCIAL

## 2018-01-09 PROCEDURE — 97110 THERAPEUTIC EXERCISES: CPT

## 2018-01-09 PROCEDURE — 97140 MANUAL THERAPY 1/> REGIONS: CPT

## 2018-01-09 NOTE — PROGRESS NOTES
Marbella Barahona  : 1985 Marbella Barahona  : 1985  Payor: Julissa Liner / Plan: [facility] / Product Type: PPO /  [id number]  at [facility]  [address], [address], [address]  [phone]   [phone]              OUTPATIENT PHYSICAL THERAPY:Initial Assessment 2018    ICD-10: Treatment Diagnosis: pain in right foot (M79.671)  Difficulty in walking, not elsewhere classified (R26.2)  Precautions/Allergies:   None per Marbella Barahona   Fall Risk Score: 1 (? 5 = High Risk)  MD Orders: Evaluate and treat MEDICAL/REFERRING DIAGNOSIS:  Pain, foot, right, chronic [M79.671, G89.29]  DATE OF ONSET: date of surgery 17  REFERRING PHYSICIAN: Brooklyn Rasheed MD; Solomon Azevedo MD  RETURN PHYSICIAN APPOINTMENT: none schedule at this time   Patient has attended 1 physical therapy session including initial evaluation. INITIAL ASSESSMENT:  Ms. Homa Ventura presents with increased pain right foot and difficulty walking status post tarsal tunnel decompression on 17 with the [facility]. She had an ankle injury in 2017 that left her unable to bear weight through her right lower extremity. [facility] gave her a preliminary diagnosis of CRPS but then performed tarsal tunnel decompression right. She is cleared for weight bearing as tolerated status post procedure. She currently uses a knee scooter to get around. She holds her foot in dorsiflexed and inverted position. She does not like to touch her foot to floor. She presents with decreased range of motion right foot and ankle. She presents with decreased independence with functional mobility and activities of daily living. She would benefit from skilled physical therapy in order to restore prior level of function and prevent future impairment. PROBLEM LIST (Impacting functional limitations):  1. Decreased Strength  2. Decreased ADL/Functional Activities  3.  Decreased Transfer Abilities  4. Decreased Ambulation Ability/Technique  5. Decreased Balance  6. Increased Pain  7. Decreased Activity Tolerance  8. Decreased Pacing Skills  9. Decreased Work Simplification/Energy Conservation Techniques  10. Decreased Flexibility/Joint Mobility  11. Edema/Girth  12. Decreased [place] with Home Exercise Program INTERVENTIONS PLANNED:  1. Balance Exercise  2. Cold  3. Family Education  4. Gait Training  5. Heat  6. Home Exercise Program (HEP)  7. Manual Therapy  8. Neuromuscular Re-education/Strengthening  9. Range of Motion (ROM)  10. Therapeutic Activites  11. Therapeutic Exercise/Strengthening  12. Transcutaneous Electrical Nerve Stimulation (TENS)  13. Transfer Training  14. Ultrasound (US)  15. orthotic management and training   TREATMENT PLAN:  Effective Dates: 1/2/18 TO 4/2/18. Frequency/Duration: 2 times a week for 8 weeks  GOALS: (Goals have been discussed and agreed upon with patient.)  Discharge Goals: Time Frame: 8 weeks  1. Patient will be independent with home exercise program within 8 weeks in order to improve independence with management of patient's symptoms and/or functional deficits. 2. Patient will improve their foot and ankle ability measure score to 20/84 within 8 weeks in order to improve pain free independence with functional mobility. 3. Patient will ambulate with equal weight bearing with use of least restrictive device within 8 weeks in order to improve pain free independence with functional mobility. Rehabilitation Potential For Stated Goals: Good              The information in this section was collected on 1/2/18 (except where otherwise noted). HISTORY:   History of Present Injury/Illness (Reason for Referral):  Ms. Peter Muro presents with increased pain right foot and difficulty walking status post tarsal tunnel decompression on 12/7/17 with the [facility].  She had an ankle injury in April 2017 that left her unable to bear weight through her right lower extremity. [address] gave her a preliminary diagnosis of CRPS but then performed tarsal tunnel decompression right. She is cleared for weight bearing as tolerated status post procedure. She currently uses a knee scooter to get around. She holds her foot in dorsiflexed and inverted position. She does not like to touch her foot to floor. 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 Current Medications: vuvanse sonata, birth control, advil, tylenol    Date Last Reviewed:  1/9/2018   Number of Personal Factors/Comorbidities that affect the Plan of Care: 1-2: MODERATE COMPLEXITY   EXAMINATION:   Observation/Orthostatic Postural Assessment:  Assessed 1/2/18 Rohini Vyas sits with forward head and rounded shoulders which indicate tight anterior chest musculature, upper trapezius, and levator scapula and weak posterior scapula musculature and deep cervical flexors. She holds her foot in dorsiflexed and inverted position.  She does not touch foot to floor when sitting  Palpation:  Assessed 1/2/18         Rohini Vyas notes tenderness with palpation right plantar fascia, decreased myofascial mobility right foot/ankle and surrounding incision, increased edema right ankle/foot  ROM:  Assessed 1/2/18  Left ankle dorsiflexion active range of motion with knee flexed: 10 degrees  Left ankle dorsiflexion active range of motion with knee extended: 10 degrees  Left ankle dorsiflexion passive range of motion with knee extended: 20 degrees  Left ankle plantarflexion active range of motion: 35 degrees    Right ankle dorsiflexion active range of motion with knee flexed: 10 degrees  Rightt ankle dorsiflexion active range of motion with knee extended: 10 degrees  Right ankle dorsiflexion passive range of motion with knee extended: 12 degrees  Rightt ankle plantarflexion active range of motion: 30 degrees  At least 30 degrees of ankle inversion/eversion passive range of motion    Right first ray dorsiflexed with hypomobility plantarflexion   Strength:  Assessed 1/2/18         At least 3/5 strength right ankle - will continue to assess as appropriate  4+/5 left ankle and lower extremity all major muscle groups  Special Tests:    Neurological Screen: Assessed 1/2/18        Myotomes:  Strong left lower extremity, weak right ankle/foot due to recent procedure        Dermatomes: Altered sensation right foot/ankle        Reflexes:    Functional Mobility: Assessed 1/2/18 Marella Lefort is modified independent with functional mobility and activities of daily living with use of scooter, she notes she has tried to shift weight to right lower extremity with use of rolling walker but has difficulty  Balance:  Assessed 1/2/17         Poor standing dynamic balance - fair standing dynamic balance with use of scooter  Mental Status:  Assessed 1/2/17        Alert and oriented x 4   Body Structures Involved:  1. Nerves  2. Bones  3. Joints  4. Muscles  5. Ligaments Body Functions Affected:  1. Sensory/Pain  2. Neuromusculoskeletal  3. Movement Related  4. Skin Related Activities and Participation Affected:  1. General Tasks and Demands  2. Mobility  3. Self Care  4. Domestic Life  5. Interpersonal Interactions and Relationships  6.  Community, Social and [place]   Number of elements that affect the Plan of Care: 3: MODERATE COMPLEXITY   CLINICAL PRESENTATION:   Presentation: Evolving clinical presentation with changing clinical characteristics: MODERATE COMPLEXITY   CLINICAL DECISION MAKING:   Outcome Measure: Assessed 1/2/18  Tool Used: PT/OT FOOT AND ANKLE ABILITY MEASURE  Score:  Initial: 1/84    Interpretation of Score: For the \"Activities of Daily Living\", there are 21 questions each scored on a 5 point scale with 0 representing \"Unable to do\" and 4 representing \"No difficulty\". The lower the score, the greater the functional disability. 84/84 represents no disability. Minimal detectable change is 5.7 points. With the addition of the 8 questions in the \"Sports Subscale,\" there are 29 questions, each scored on a 5 point scale with 0 representing \"Unable to do\" and 4 representing \"No difficulty\". The lower the score, the greater the functional disability. 116/116 represents no disability. Minimal detectable change is 12.3 points. Activities of Daily Living:  Score 84 83-68 67-51 50-34 33-18 17-1 0   Modifier CH CI CJ CK CL CM CN     Medical Necessity:   · Patient is expected to demonstrate progress in strength, range of motion, balance, coordination and functional technique to increase independence with pain free functional mobility. · Patient demonstrates good rehab potential due to higher previous functional level. Reason for Services/Other Comments:  · Patient continues to require skilled intervention due to decreased independence with functional mobility.    Use of outcome tool(s) and clinical judgement create a POC that gives a: Questionable prediction of patient's progress: MODERATE COMPLEXITY            TREATMENT:   (In addition to Assessment/Re-Assessment sessions the following treatments were rendered)  Pre-treatment Symptoms/Complaints:  Ada Bernal notes 3/10 pain in right ankle and foot - she notes she enjoyed aquatic therapy and did not have too much increase in pain following session   Pain: Initial:   Pain Intensity 1: 3  Pain Location 1: Foot, Ankle  Pain Orientation 1: Right  Pain Intervention(s) 1: Exercise, Therapeutic touch, Cold pack      (In addition to Assessment/Re-Assessment sessions the following treatments were rendered)  Therapeutic Exercise: (16 Minutes):  Exercises per grid below to improve mobility, strength and coordination. Required minimal visual and verbal cues to promote proper body alignment and promote proper body posture. Progressed range and complexity of movement as indicated. 1. Gentle active range of motion right for improved mobility and strength - ABC, dorsiflexion/plantarflexion, inversion/eversion, circles     2. Gentle passive dorsiflexion stretching with towel with foot inverted to reduce tibial nerve tension 3 sets 30 seconds right    3. Tibial nerve gliding technique with foot plantarflexed and inverted and short arc quads 20 repetitions right     4. Gentle weight bearing dorsiflexion stretch in sitting with toes on towel roll and foot inverted 3 sets 30 seconds right     5. NuStep for close chain dynamic mobility right ankle/foot level 1 8 minutes     Manual Therapy (    Soft Tissue Mobilization Duration  Duration: 25 Minutes): Myofascial release to right ankle/foot for improved mobility, muscle relaxation and decreased pain   Cross friction massage to right ankle incision to prevent adhesions   First ray plantarflexion/dorsiflexion mobilization for improved mobility   Plantar fascia cross friction massage for improved mobility     Therapeutic Modalities: for decreased pain and inflammation                       Right Ankle Cold  Type: Cold/ice pack  Duration : 10 minutes  Patient Position: Supine                                                                         Treatment/Session Assessment:    Response to Treatment:  Cecy Goetz with no change in pain level - decreased sensitivity right foot with manual therapy this date  Pain: Post Treatment Session: 3/10  · Compliance with Program/Exercises: Will assess as treatment progresses.   · Recommendations/Intent for next treatment session: \"Next visit will focus on advancements to more challenging activities and aquatic therapy for improved weight bearing right lower extremity\".   Total Treatment Duration:  PT Patient Time In/Time Out  Time In: 1100  Time Out: [address], PT

## 2018-01-11 ENCOUNTER — HOSPITAL ENCOUNTER (OUTPATIENT)
Dept: PHYSICAL THERAPY | Age: 33
Discharge: HOME | End: 2018-01-11
Payer: COMMERCIAL

## 2018-01-11 PROCEDURE — 97113 AQUATIC THERAPY/EXERCISES: CPT

## 2018-01-11 NOTE — PROGRESS NOTES
Jarocho Schilder  : 1985 Jarocho Schilder  : 1985  Payor: Amira Orozco / Plan: [facility] / Product Type: PPO /  [address]  at   [address], [address], [place] [address] [address] [facility]  [phone]   [phone]              OUTPATIENT PHYSICAL THERAPY:Daily Note and Aquatic Note 2018    ICD-10: Treatment Diagnosis: pain in right foot (M79.671)  Difficulty in walking, not elsewhere classified (R26.2)  Precautions/Allergies:   None per Dessie Schilder   Fall Risk Score: 1 (? 5 = High Risk)  MD Orders: Evaluate and treat MEDICAL/REFERRING DIAGNOSIS:  Pain, foot, right, chronic [M79.671, G89.29]  DATE OF ONSET: date of surgery 17  REFERRING PHYSICIAN: Hong Guerra MD; Win Hightower MD  RETURN PHYSICIAN APPOINTMENT: none schedule at this time   Patient has attended 1 physical therapy session including initial evaluation. INITIAL ASSESSMENT:  Ms. Kristen Jaquez presents with increased pain right foot and difficulty walking status post tarsal tunnel decompression on 17 with the [facility]. She had an ankle injury in 2017 that left her unable to bear weight through her right lower extremity. [facility] gave her a preliminary diagnosis of CRPS but then performed tarsal tunnel decompression right. She is cleared for weight bearing as tolerated status post procedure. She currently uses a knee scooter to get around. She holds her foot in dorsiflexed and inverted position. She does not like to touch her foot to floor. She presents with decreased range of motion right foot and ankle. She presents with decreased independence with functional mobility and activities of daily living. She would benefit from skilled physical therapy in order to restore prior level of function and prevent future impairment. PROBLEM LIST (Impacting functional limitations):  1. Decreased Strength  2. Decreased ADL/Functional Activities  3.  Decreased Transfer Abilities  4. Decreased Ambulation Ability/Technique  5. Decreased Balance  6. Increased Pain  7. Decreased Activity Tolerance  8. Decreased Pacing Skills  9. Decreased Work Simplification/Energy Conservation Techniques  10. Decreased Flexibility/Joint Mobility  11. Edema/Girth  12. Decreased [place] with Home Exercise Program INTERVENTIONS PLANNED:  1. Balance Exercise  2. Cold  3. Family Education  4. Gait Training  5. Heat  6. Home Exercise Program (HEP)  7. Manual Therapy  8. Neuromuscular Re-education/Strengthening  9. Range of Motion (ROM)  10. Therapeutic Activites  11. Therapeutic Exercise/Strengthening  12. Transcutaneous Electrical Nerve Stimulation (TENS)  13. Transfer Training  14. Ultrasound (US)  15. orthotic management and training   TREATMENT PLAN:  Effective Dates: 1/2/18 TO 4/2/18. Frequency/Duration: 2 times a week for 8 weeks  GOALS: (Goals have been discussed and agreed upon with patient.)  Discharge Goals: Time Frame: 8 weeks  1. Patient will be independent with home exercise program within 8 weeks in order to improve independence with management of patient's symptoms and/or functional deficits. 2. Patient will improve their foot and ankle ability measure score to 20/84 within 8 weeks in order to improve pain free independence with functional mobility. 3. Patient will ambulate with equal weight bearing with use of least restrictive device within 8 weeks in order to improve pain free independence with functional mobility. Rehabilitation Potential For Stated Goals: Good  Regarding Leslie Centeno's therapy, I certify that the treatment plan above will be carried out by a therapist or under their direction. Thank you for this referral,  Samantha Mcfarlane PTA     Referring Physician Signature:               Date                    The information in this section was collected on 1/2/18 (except where otherwise noted).   HISTORY:   History of Present Injury/Illness (Reason for Referral):  Ms. Jovanna Ha presents with increased pain right foot and difficulty walking status post tarsal tunnel decompression on 12/7/17 with the [facility]. She had an ankle injury in April 2017 that left her unable to bear weight through her right lower extremity. [facility] gave her a preliminary diagnosis of CRPS but then performed tarsal tunnel decompression right. She is cleared for weight bearing as tolerated status post procedure. She currently uses a knee scooter to get around. She holds her foot in dorsiflexed and inverted position. She does not like to touch her foot to floor. 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 Current Medications: vuvanse sonata, birth control, advil, tylenol    Date Last Reviewed:  1/11/2018   Number of Personal Factors/Comorbidities that affect the Plan of Care: 1-2: MODERATE COMPLEXITY   EXAMINATION:   Observation/Orthostatic Postural Assessment:  Assessed 1/2/18 Ada Bernal sits with forward head and rounded shoulders which indicate tight anterior chest musculature, upper trapezius, and levator scapula and weak posterior scapula musculature and deep cervical flexors. She holds her foot in dorsiflexed and inverted position.  She does not touch foot to floor when sitting  Palpation:  Assessed 1/2/18         Ada Bernal notes tenderness with palpation right plantar fascia, decreased myofascial mobility right foot/ankle and surrounding incision, increased edema right ankle/foot  ROM:  Assessed 1/2/18  Left ankle dorsiflexion active range of motion with knee flexed: 10 degrees  Left ankle dorsiflexion active range of motion with knee extended: 10 degrees  Left ankle dorsiflexion passive range of motion with knee extended: 20 degrees  Left ankle plantarflexion active range of motion: 35 degrees    Right ankle dorsiflexion active range of motion with knee flexed: 10 degrees  Rightt ankle dorsiflexion active range of motion with knee extended: 10 degrees  Right ankle dorsiflexion passive range of motion with knee extended: 12 degrees  Rightt ankle plantarflexion active range of motion: 30 degrees  At least 30 degrees of ankle inversion/eversion passive range of motion    Right first ray dorsiflexed with hypomobility plantarflexion   Strength:  Assessed 1/2/18         At least 3/5 strength right ankle - will continue to assess as appropriate  4+/5 left ankle and lower extremity all major muscle groups  Special Tests:    Neurological Screen: Assessed 1/2/18        Myotomes:  Strong left lower extremity, weak right ankle/foot due to recent procedure        Dermatomes: Altered sensation right foot/ankle        Reflexes:    Functional Mobility: Assessed 1/2/18 Pecolia Hashimoto is modified independent with functional mobility and activities of daily living with use of scooter, she notes she has tried to shift weight to right lower extremity with use of rolling walker but has difficulty  Balance:  Assessed 1/2/17         Poor standing dynamic balance - fair standing dynamic balance with use of scooter  Mental Status:  Assessed 1/2/17        Alert and oriented x 4   Body Structures Involved:  1. Nerves  2. Bones  3. Joints  4. Muscles  5. Ligaments Body Functions Affected:  1. Sensory/Pain  2. Neuromusculoskeletal  3. Movement Related  4. Skin Related Activities and Participation Affected:  1. General Tasks and Demands  2. Mobility  3. Self Care  4. Domestic Life  5.  Interpersonal Interactions and Relationships  6. Community, Social and [place]   Number of elements that affect the Plan of Care: 3: MODERATE COMPLEXITY   CLINICAL PRESENTATION:   Presentation: Evolving clinical presentation with changing clinical characteristics: MODERATE COMPLEXITY   CLINICAL DECISION MAKING:   Outcome Measure: Assessed 1/2/18  Tool Used: PT/OT FOOT AND ANKLE ABILITY MEASURE  Score:  Initial: 1/84    Interpretation of Score: For the \"Activities of Daily Living\", there are 21 questions each scored on a 5 point scale with 0 representing \"Unable to do\" and 4 representing \"No difficulty\". The lower the score, the greater the functional disability. 84/84 represents no disability. Minimal detectable change is 5.7 points. With the addition of the 8 questions in the \"Sports Subscale,\" there are 29 questions, each scored on a 5 point scale with 0 representing \"Unable to do\" and 4 representing \"No difficulty\". The lower the score, the greater the functional disability. 116/116 represents no disability. Minimal detectable change is 12.3 points. Activities of Daily Living:  Score 84 83-68 67-51 50-34 33-18 17-1 0   Modifier CH CI CJ CK CL CM CN     Medical Necessity:   · Patient is expected to demonstrate progress in strength, range of motion, balance, coordination and functional technique to increase independence with pain free functional mobility. · Patient demonstrates good rehab potential due to higher previous functional level. Reason for Services/Other Comments:  · Patient continues to require skilled intervention due to decreased independence with functional mobility.    Use of outcome tool(s) and clinical judgement create a POC that gives a: Questionable prediction of patient's progress: MODERATE COMPLEXITY            TREATMENT:   (In addition to Assessment/Re-Assessment sessions the following treatments were rendered)  Pre-treatment Symptoms/Complaints:  Cece Streeter notes 2/10 pain at rest in the right foot and 4-5/10 with exercise. Patient presents with father and scooter. Pain: Initial:     2/10 at rest      Aquatic Therapy ( 45  minutes): Aquatic treatment performed per flow grid for Decreased muscle strength, Decreased endurance and Decreased activity endurance. Cues provided for alignment and weight bearing. .  Assistance by therapist provided for patient to be able to weight bear on R LE.     Aquatic Exercise Log       Date  1-4-18 Date  1-11-18 Date   Date   Date     Activity/ Exercise Parameters Parameters Parameters Parameters Parameters   Walking forward 2 laps with rail and therapist  Seated on noodle - pulling forward x 2 laps      Walking backward  Seated on noodle - pushing backward x 2 laps      Walking sideways In deeper water x 1 lap Seated on noodle - each direction x 2 laps        Marching          Goose Step          Tip toes          Heels          Lunges        Side step squats        LE Exercises Small noodle          Hip Flex/Ext Marching x 10 B         Hip Abd/Add          Hip IR/ER          Leg press  Just getting foot on wall and rocking back and forth - 5 min        Knee Flex No noodle x 10 B         Squats          Leg Circles Deeper water x 10 each way each leg Deeper water x 10 each way each leg        Step Ups Small step x 10 B Small step   X 10 B      UE Exercises          Squeeze In          Push Down          Pull Down          Bicep/Tricep        Rows/Press outs         Chi Positions          Trunk Rotation        Deep H2O/ Noodles noodle Noodle and rings        Stabilization Core stability Cor stability        Arms only  Straddle - 2 laps        Legs only Bicycle- 2 laps Straddle - 2 laps   Both - 2 laps      Cross   Country X 25  X 25        Scissors X 25  Combo x 20  X 25  Combo x 20        Crab walk Seated on noodle in shallow - walking forward, backwards, and side stepping x 2 laps each        Lower abdominal   work  Knees to chest x 10  Knees to chest    x 10 Cardio          Jogging  Noodle under R foot rolling arch to touch toes then heel  X 8      Lap   Swimming          Stretches On bench  On bench           Hamstrings X 3 B X 3 B        Heelcords X 3 B X 3 B        Piriformis X 3 B X 3 B      R foot  By therapist    By therapist                  h      Treatment/Session Assessment:  Patient was assisted out of pool at edge by father. No other complaints. Focused more on range and putting foot on ground today. Response to Treatment:  Umberto Martino tolerated aquatic session. Encouraged more movement today in foot. Pain: Post Treatment Session: 5/10  · Compliance with Program/Exercises: Will assess as treatment progresses. · Recommendations/Intent for next treatment session: \"Next visit will focus on advancements to more challenging activities and aquatic therapy for improved weight bearing right lower extremity\".   Total Treatment Duration:   PT Patient Time In/Time Out  Time In: 1130  Time Out: [address] [facility] ARLETTE Lugo

## 2018-01-16 ENCOUNTER — APPOINTMENT (OUTPATIENT)
Dept: PHYSICAL THERAPY | Age: 33
End: 2018-01-16
Payer: COMMERCIAL

## 2018-01-16 ENCOUNTER — HOSPITAL ENCOUNTER (OUTPATIENT)
Dept: PHYSICAL THERAPY | Age: 33
Discharge: HOME | End: 2018-01-16
Payer: COMMERCIAL

## 2018-01-16 PROCEDURE — 97113 AQUATIC THERAPY/EXERCISES: CPT

## 2018-01-16 NOTE — PROGRESS NOTES
Cherl Alba  : 1985 Cherl Alba  : 1985  Payor: Marietta Michele / Plan: [facility] / Product Type: PPO /  [id number]  at [facility]  [address], [address], [place] [address] [facility]  [phone]   [phone]              OUTPATIENT PHYSICAL THERAPY:Daily Note and Aquatic Note 2018    ICD-10: Treatment Diagnosis: pain in right foot (M79.671)  Difficulty in walking, not elsewhere classified (R26.2)  Precautions/Allergies:   None per Enriqueta Dutton   Fall Risk Score: 1 (? 5 = High Risk)  MD Orders: Evaluate and treat MEDICAL/REFERRING DIAGNOSIS:  Pain, foot, right, chronic [M79.671, G89.29]  DATE OF ONSET: date of surgery 17  REFERRING PHYSICIAN: Christie Lara MD; Marcia Faustin MD  RETURN PHYSICIAN APPOINTMENT: none schedule at this time   Patient has attended 1 physical therapy session including initial evaluation. INITIAL ASSESSMENT:  Ms. Shon Wilkins presents with increased pain right foot and difficulty walking status post tarsal tunnel decompression on 17 with the [facility]. She had an ankle injury in 2017 that left her unable to bear weight through her right lower extremity. [facility] gave her a preliminary diagnosis of CRPS but then performed tarsal tunnel decompression right. She is cleared for weight bearing as tolerated status post procedure. She currently uses a knee scooter to get around. She holds her foot in dorsiflexed and inverted position. She does not like to touch her foot to floor. She presents with decreased range of motion right foot and ankle. She presents with decreased independence with functional mobility and activities of daily living. She would benefit from skilled physical therapy in order to restore prior level of function and prevent future impairment. PROBLEM LIST (Impacting functional limitations):  1. Decreased Strength  2. Decreased ADL/Functional Activities  3.  Decreased Transfer Abilities  4. Decreased Ambulation Ability/Technique  5. Decreased Balance  6. Increased Pain  7. Decreased Activity Tolerance  8. Decreased Pacing Skills  9. Decreased Work Simplification/Energy Conservation Techniques  10. Decreased Flexibility/Joint Mobility  11. Edema/Girth  12. Decreased [place] with Home Exercise Program INTERVENTIONS PLANNED:  1. Balance Exercise  2. Cold  3. Family Education  4. Gait Training  5. Heat  6. Home Exercise Program (HEP)  7. Manual Therapy  8. Neuromuscular Re-education/Strengthening  9. Range of Motion (ROM)  10. Therapeutic Activites  11. Therapeutic Exercise/Strengthening  12. Transcutaneous Electrical Nerve Stimulation (TENS)  13. Transfer Training  14. Ultrasound (US)  15. orthotic management and training   TREATMENT PLAN:  Effective Dates: 1/2/18 TO 4/2/18. Frequency/Duration: 2 times a week for 8 weeks  GOALS: (Goals have been discussed and agreed upon with patient.)  Discharge Goals: Time Frame: 8 weeks  1. Patient will be independent with home exercise program within 8 weeks in order to improve independence with management of patient's symptoms and/or functional deficits. 2. Patient will improve their foot and ankle ability measure score to 20/84 within 8 weeks in order to improve pain free independence with functional mobility. 3. Patient will ambulate with equal weight bearing with use of least restrictive device within 8 weeks in order to improve pain free independence with functional mobility. Rehabilitation Potential For Stated Goals: Good  Regarding Gypsy Centeno's therapy, I certify that the treatment plan above will be carried out by a therapist or under their direction. Thank you for this referral,  Karoline Guzman PTA     Referring Physician Signature:               Date                    The information in this section was collected on 1/2/18 (except where otherwise noted).   HISTORY:   History of Present Injury/Illness (Reason for Referral):  Ms. Reza Mcmahan presents with increased pain right foot and difficulty walking status post tarsal tunnel decompression on 12/7/17 with the [facility]. She had an ankle injury in April 2017 that left her unable to bear weight through her right lower extremity. [facility] gave her a preliminary diagnosis of CRPS but then performed tarsal tunnel decompression right. She is cleared for weight bearing as tolerated status post procedure. She currently uses a knee scooter to get around. She holds her foot in dorsiflexed and inverted position. She does not like to touch her foot to floor. 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 Current Medications: vuvanse sonata, birth control, advil, tylenol    Date Last Reviewed:  1/16/2018   Number of Personal Factors/Comorbidities that affect the Plan of Care: 1-2: MODERATE COMPLEXITY   EXAMINATION:   Observation/Orthostatic Postural Assessment:  Assessed 1/2/18 Cecy Goetz sits with forward head and rounded shoulders which indicate tight anterior chest musculature, upper trapezius, and levator scapula and weak posterior scapula musculature and deep cervical flexors. She holds her foot in dorsiflexed and inverted position.  She does not touch foot to floor when sitting  Palpation:  Assessed 1/2/18         Cecy Goetz notes tenderness with palpation right plantar fascia, decreased myofascial mobility right foot/ankle and surrounding incision, increased edema right ankle/foot  ROM:  Assessed 1/2/18  Left ankle dorsiflexion active range of motion with knee flexed: 10 degrees  Left ankle dorsiflexion active range of motion with knee extended: 10 degrees  Left ankle dorsiflexion passive range of motion with knee extended: 20 degrees  Left ankle plantarflexion active range of motion: 35 degrees    Right ankle dorsiflexion active range of motion with knee flexed: 10 degrees  Rightt ankle dorsiflexion active range of motion with knee extended: 10 degrees  Right ankle dorsiflexion passive range of motion with knee extended: 12 degrees  Rightt ankle plantarflexion active range of motion: 30 degrees  At least 30 degrees of ankle inversion/eversion passive range of motion    Right first ray dorsiflexed with hypomobility plantarflexion   Strength:  Assessed 1/2/18         At least 3/5 strength right ankle - will continue to assess as appropriate  4+/5 left ankle and lower extremity all major muscle groups  Special Tests:    Neurological Screen: Assessed 1/2/18        Myotomes:  Strong left lower extremity, weak right ankle/foot due to recent procedure        Dermatomes: Altered sensation right foot/ankle        Reflexes:    Functional Mobility: Assessed 1/2/18 Marcos Viveros is modified independent with functional mobility and activities of daily living with use of scooter, she notes she has tried to shift weight to right lower extremity with use of rolling walker but has difficulty  Balance:  Assessed 1/2/17         Poor standing dynamic balance - fair standing dynamic balance with use of scooter  Mental Status:  Assessed 1/2/17        Alert and oriented x 4   Body Structures Involved:  1. Nerves  2. Bones  3. Joints  4. Muscles  5. Ligaments Body Functions Affected:  1. Sensory/Pain  2. Neuromusculoskeletal  3. Movement Related  4. Skin Related Activities and Participation Affected:  1. General Tasks and Demands  2. Mobility  3. Self Care  4. Domestic Life  5.  Interpersonal Interactions and Relationships  6. Community, Social and [place]   Number of elements that affect the Plan of Care: 3: MODERATE COMPLEXITY   CLINICAL PRESENTATION:   Presentation: Evolving clinical presentation with changing clinical characteristics: MODERATE COMPLEXITY   CLINICAL DECISION MAKING:   Outcome Measure: Assessed 1/2/18  Tool Used: PT/OT FOOT AND ANKLE ABILITY MEASURE  Score:  Initial: 1/84    Interpretation of Score: For the \"Activities of Daily Living\", there are 21 questions each scored on a 5 point scale with 0 representing \"Unable to do\" and 4 representing \"No difficulty\". The lower the score, the greater the functional disability. 84/84 represents no disability. Minimal detectable change is 5.7 points. With the addition of the 8 questions in the \"Sports Subscale,\" there are 29 questions, each scored on a 5 point scale with 0 representing \"Unable to do\" and 4 representing \"No difficulty\". The lower the score, the greater the functional disability. 116/116 represents no disability. Minimal detectable change is 12.3 points. Activities of Daily Living:  Score 84 83-68 67-51 50-34 33-18 17-1 0   Modifier CH CI CJ CK CL CM CN     Medical Necessity:   · Patient is expected to demonstrate progress in strength, range of motion, balance, coordination and functional technique to increase independence with pain free functional mobility. · Patient demonstrates good rehab potential due to higher previous functional level. Reason for Services/Other Comments:  · Patient continues to require skilled intervention due to decreased independence with functional mobility. Use of outcome tool(s) and clinical judgement create a POC that gives a: Questionable prediction of patient's progress: MODERATE COMPLEXITY            TREATMENT:   (In addition to Assessment/Re-Assessment sessions the following treatments were rendered)  Pre-treatment Symptoms/Complaints:  Rivka Pittman notes 3/10 pain at rest in R foot. She presents on scooter, and even when changing does not put foot on floor until therapist gives verbal cue to place foot on floor. She reports she is doing some things at home, but not everything. Pain: Initial:     3/10 at rest      Aquatic Therapy ( 45  minutes): Aquatic treatment performed per flow grid for Decreased muscle strength, Decreased endurance and Decreased activity endurance. Cues provided for alignment and weight bearing. .  Assistance by therapist provided for patient to be able to weight bear on R LE.     Aquatic Exercise Log       Date  1-4-18 Date  1-11-18 Date  1-16-18 Date   Date     Activity/ Exercise Parameters Parameters Parameters Parameters Parameters   Walking forward 2 laps with rail and therapist  Seated on noodle - pulling forward x 2 laps Seated on noodle - 4 laps     Walking backward  Seated on noodle - pushing backward x 2 laps Seated on noodle - 4 laps     Walking sideways In deeper water x 1 lap Seated on noodle - each direction x 2 laps Seated on noodle - wide knees and close knees- 2 laps each        Marching   Standing in 4.5 feet  Tapping R foot to big step and back 2 x 10        Goose Step          Tip toes          Heels          Lunges        Side step squats        LE Exercises Small noodle   noodle       Hip Flex/Ext Marching x 10 B  Marching x 15 B       Hip Abd/Add   X 15 B       Hip IR/ER          Leg press  Just getting foot on wall and rocking back and forth - 5 min        Knee Flex No noodle x 10 B         Squats          Leg Circles Deeper water x 10 each way each leg Deeper water x 10 each way each leg Deeper water x 10 each way each leg       Step Ups Small step x 10 B Small step   X 10 B      UE Exercises          Squeeze In          Push Down          Pull Down          Bicep/Tricep        Rows/Press outs         Chi Positions          Trunk Rotation        Deep H2O/ Noodles noodle Noodle and rings Noodle and rings       Stabilization Core stability Cor stability        Arms only  Straddle - 2 laps        Legs only Bicycle- 2 laps Straddle - 2 laps   Both - 2 laps Arms and legs - straddle - 6 laps     Cross   Country X 25  X 25 2 minutes       Scissors X 25  Combo x 20  X 25  Combo x 20 2 minutes  Combo - 2 minutes       Crab walk Seated on noodle in shallow - walking forward, backwards, and side stepping x 2 laps each   4 laps     Lower abdominal   work  Knees to chest x 10  Knees to chest    x 10 Knees to chest x 10        Cardio          Jogging  Noodle under R foot rolling arch to touch toes then heel  X 8      Lap   Swimming          Stretches On bench  On bench  On bench          Hamstrings X 3 B X 3 B X 3 B       Heelcords X 3 B X 3 B X 3 B       Piriformis X 3 B X 3 B X 3 B     R foot  By therapist    By therapist By therapist                   Treatment/Session Assessment:  Patient was assisted out of pool at edge by father at bench end, so no steps used to exit pool. No other complaints. Focused on endurance and range today. Response to Treatment:  Rivka Toya tolerated aquatic session. Encouraged more movement today in foot. Pain: Post Treatment Session: 5-6/10  · Compliance with Program/Exercises: Will assess as treatment progresses. · Recommendations/Intent for next treatment session: \"Next visit will focus on advancements to more challenging activities and aquatic therapy for improved weight bearing right lower extremity\".   Total Treatment Duration:   PT Patient Time In/Time Out  Time In: 1045  Time Out: [address], PTA

## 2018-01-17 ENCOUNTER — APPOINTMENT (OUTPATIENT)
Dept: PHYSICAL THERAPY | Age: 33
End: 2018-01-17
Payer: COMMERCIAL

## 2018-01-18 ENCOUNTER — HOSPITAL ENCOUNTER (OUTPATIENT)
Dept: PHYSICAL THERAPY | Age: 33
Discharge: HOME | End: 2018-01-18
Payer: COMMERCIAL

## 2018-01-18 PROCEDURE — 97113 AQUATIC THERAPY/EXERCISES: CPT

## 2018-01-18 NOTE — PROGRESS NOTES
Whitewatermaurilio Trent  : 1985 Whitewatermaurilio Trent  : 1985  Payor: Graciela Daily / Plan: [place] [facility] / Product Type: PPO /  [address] at [address] [facility], [address] [facility], [address]  [phone]   [phone]              OUTPATIENT PHYSICAL THERAPY:Daily Note and Aquatic Note 2018    ICD-10: Treatment Diagnosis: pain in right foot (M79.671)  Difficulty in walking, not elsewhere classified (R26.2)  Precautions/Allergies:   None per Blake Trent   Fall Risk Score: 1 (? 5 = High Risk)  MD Orders: Evaluate and treat MEDICAL/REFERRING DIAGNOSIS:  Pain, foot, right, chronic [M79.671, G89.29]  DATE OF ONSET: date of surgery 17  REFERRING PHYSICIAN: Nicolette Vazquez MD; Regine Dominguez MD  RETURN PHYSICIAN APPOINTMENT: none schedule at this time   Patient has attended 1 physical therapy session including initial evaluation. INITIAL ASSESSMENT:  Ms. Richard Willis presents with increased pain right foot and difficulty walking status post tarsal tunnel decompression on 17 with the [facility]. She had an ankle injury in 2017 that left her unable to bear weight through her right lower extremity. [facility] gave her a preliminary diagnosis of CRPS but then performed tarsal tunnel decompression right. She is cleared for weight bearing as tolerated status post procedure. She currently uses a knee scooter to get around. She holds her foot in dorsiflexed and inverted position. She does not like to touch her foot to floor. She presents with decreased range of motion right foot and ankle. She presents with decreased independence with functional mobility and activities of daily living. She would benefit from skilled physical therapy in order to restore prior level of function and prevent future impairment. PROBLEM LIST (Impacting functional limitations):  1. Decreased Strength  2. Decreased ADL/Functional Activities  3.  Decreased Transfer Abilities  4. Decreased Ambulation Ability/Technique  5. Decreased Balance  6. Increased Pain  7. Decreased Activity Tolerance  8. Decreased Pacing Skills  9. Decreased Work Simplification/Energy Conservation Techniques  10. Decreased Flexibility/Joint Mobility  11. Edema/Girth  12. Decreased [place] with Home Exercise Program INTERVENTIONS PLANNED:  1. Balance Exercise  2. Cold  3. Family Education  4. Gait Training  5. Heat  6. Home Exercise Program (HEP)  7. Manual Therapy  8. Neuromuscular Re-education/Strengthening  9. Range of Motion (ROM)  10. Therapeutic Activites  11. Therapeutic Exercise/Strengthening  12. Transcutaneous Electrical Nerve Stimulation (TENS)  13. Transfer Training  14. Ultrasound (US)  15. orthotic management and training   TREATMENT PLAN:  Effective Dates: 1/2/18 TO 4/2/18. Frequency/Duration: 2 times a week for 8 weeks  GOALS: (Goals have been discussed and agreed upon with patient.)  Discharge Goals: Time Frame: 8 weeks  1. Patient will be independent with home exercise program within 8 weeks in order to improve independence with management of patient's symptoms and/or functional deficits. 2. Patient will improve their foot and ankle ability measure score to 20/84 within 8 weeks in order to improve pain free independence with functional mobility. 3. Patient will ambulate with equal weight bearing with use of least restrictive device within 8 weeks in order to improve pain free independence with functional mobility. Rehabilitation Potential For Stated Goals: Good  Regarding Dolly Centeno's therapy, I certify that the treatment plan above will be carried out by a therapist or under their direction. Thank you for this referral,  Rip Son, PTA     Referring Physician Signature:               Date                    The information in this section was collected on 1/2/18 (except where otherwise noted).   HISTORY:   History of Present Injury/Illness (Reason for Referral):  Ms. Reza Mcmahan presents with increased pain right foot and difficulty walking status post tarsal tunnel decompression on 12/7/17 with the [facility]. She had an ankle injury in April 2017 that left her unable to bear weight through her right lower extremity. [facility] gave her a preliminary diagnosis of CRPS but then performed tarsal tunnel decompression right. She is cleared for weight bearing as tolerated status post procedure. She currently uses a knee scooter to get around. She holds her foot in dorsiflexed and inverted position. She does not like to touch her foot to floor. 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 Current Medications: vuvanse sonata, birth control, advil, tylenol    Date Last Reviewed:  1/18/2018   Number of Personal Factors/Comorbidities that affect the Plan of Care: 1-2: MODERATE COMPLEXITY   EXAMINATION:   Observation/Orthostatic Postural Assessment:  Assessed 1/2/18 Cecy Goetz sits with forward head and rounded shoulders which indicate tight anterior chest musculature, upper trapezius, and levator scapula and weak posterior scapula musculature and deep cervical flexors. She holds her foot in dorsiflexed and inverted position.  She does not touch foot to floor when sitting  Palpation:  Assessed 1/2/18         Cecy Goetz notes tenderness with palpation right plantar fascia, decreased myofascial mobility right foot/ankle and surrounding incision, increased edema right ankle/foot  ROM:  Assessed 1/2/18  Left ankle dorsiflexion active range of motion with knee flexed: 10 degrees  Left ankle dorsiflexion active range of motion with knee extended: 10 degrees  Left ankle dorsiflexion passive range of motion with knee extended: 20 degrees  Left ankle plantarflexion active range of motion: 35 degrees    Right ankle dorsiflexion active range of motion with knee flexed: 10 degrees  Rightt ankle dorsiflexion active range of motion with knee extended: 10 degrees  Right ankle dorsiflexion passive range of motion with knee extended: 12 degrees  Rightt ankle plantarflexion active range of motion: 30 degrees  At least 30 degrees of ankle inversion/eversion passive range of motion    Right first ray dorsiflexed with hypomobility plantarflexion   Strength:  Assessed 1/2/18         At least 3/5 strength right ankle - will continue to assess as appropriate  4+/5 left ankle and lower extremity all major muscle groups  Special Tests:    Neurological Screen: Assessed 1/2/18        Myotomes:  Strong left lower extremity, weak right ankle/foot due to recent procedure        Dermatomes: Altered sensation right foot/ankle        Reflexes:    Functional Mobility: Assessed 1/2/18 Ada Bernal is modified independent with functional mobility and activities of daily living with use of scooter, she notes she has tried to shift weight to right lower extremity with use of rolling walker but has difficulty  Balance:  Assessed 1/2/17         Poor standing dynamic balance - fair standing dynamic balance with use of scooter  Mental Status:  Assessed 1/2/17        Alert and oriented x 4   Body Structures Involved:  1. Nerves  2. Bones  3. Joints  4. Muscles  5. Ligaments Body Functions Affected:  1. Sensory/Pain  2. Neuromusculoskeletal  3. Movement Related  4. Skin Related Activities and Participation Affected:  1. General Tasks and Demands  2. Mobility  3. Self Care  4. Domestic Life  5.  Interpersonal Interactions and Relationships  6. Community, Social and [place]   Number of elements that affect the Plan of Care: 3: MODERATE COMPLEXITY   CLINICAL PRESENTATION:   Presentation: Evolving clinical presentation with changing clinical characteristics: MODERATE COMPLEXITY   CLINICAL DECISION MAKING:   Outcome Measure: Assessed 1/2/18  Tool Used: PT/OT FOOT AND ANKLE ABILITY MEASURE  Score:  Initial: 1/84    Interpretation of Score: For the \"Activities of Daily Living\", there are 21 questions each scored on a 5 point scale with 0 representing \"Unable to do\" and 4 representing \"No difficulty\". The lower the score, the greater the functional disability. 84/84 represents no disability. Minimal detectable change is 5.7 points. With the addition of the 8 questions in the \"Sports Subscale,\" there are 29 questions, each scored on a 5 point scale with 0 representing \"Unable to do\" and 4 representing \"No difficulty\". The lower the score, the greater the functional disability. 116/116 represents no disability. Minimal detectable change is 12.3 points. Activities of Daily Living:  Score 84 83-68 67-51 50-34 33-18 17-1 0   Modifier CH CI CJ CK CL CM CN     Medical Necessity:   · Patient is expected to demonstrate progress in strength, range of motion, balance, coordination and functional technique to increase independence with pain free functional mobility. · Patient demonstrates good rehab potential due to higher previous functional level. Reason for Services/Other Comments:  · Patient continues to require skilled intervention due to decreased independence with functional mobility. Use of outcome tool(s) and clinical judgement create a POC that gives a: Questionable prediction of patient's progress: MODERATE COMPLEXITY            TREATMENT:   (In addition to Assessment/Re-Assessment sessions the following treatments were rendered)  Pre-treatment Symptoms/Complaints:  Anna Byrne notes 2/10 pain at rest in R foot. She presents with mom and dad with scooter. Pain: Initial:     2/10 at rest      Aquatic Therapy ( 55  minutes): Aquatic treatment performed per flow grid for Decreased muscle strength, Decreased endurance and Decreased activity endurance. Cues provided for alignment and weight bearing. .  Assistance by therapist provided for patient to be able to weight bear on R LE.     Aquatic Exercise Log       Date  1-4-18 Date  1-11-18 Date  1-16-18 Date  1-18-18 Date     Activity/ Exercise Parameters Parameters Parameters Parameters Parameters   Walking forward 2 laps with rail and therapist  Seated on noodle - pulling forward x 2 laps Seated on noodle - 4 laps Seated on noodle - 4 laps    Walking backward  Seated on noodle - pushing backward x 2 laps Seated on noodle - 4 laps Seated on noodle - 4 laps    Walking sideways In deeper water x 1 lap Seated on noodle - each direction x 2 laps Seated on noodle - wide knees and close knees- 2 laps each  Seated on noodle - wide leg step and close knees - both 2 laps each       Marching   Standing in 4.5 feet  Tapping R foot to big step and back 2 x 10  Standing in 4.5 feet - tapping R foot fat on step and back to bottom of pool - keeping foot flat      Goose Step          Tip toes          Heels          Lunges        Side step squats        LE Exercises Small noodle   noodle noodle      Hip Flex/Ext Marching x 10 B  Marching x 15 R Marching x 15 R      Hip Abd/Add   X 15 R X 15 R      Hip IR/ER          Leg press  Just getting foot on wall and rocking back and forth - 5 min        Knee Flex No noodle x 10 B         Squats          Leg Circles Deeper water x 10 each way each leg Deeper water x 10 each way each leg Deeper water x 10 each way each leg Deeper water x 10 each way each leg      Step Ups Small step x 10 B Small step   X 10 B      UE Exercises          Squeeze In          Push Down          Pull Down          Bicep/Tricep        Rows/Press outs         Chi Positions Trunk Rotation        Deep H2O/ Noodles noodle Noodle and rings Noodle and rings Noodle and rings      Stabilization Core stability Cor stability  Core stability      Arms only  Straddle - 2 laps        Legs only Bicycle- 2 laps Straddle - 2 laps   Both - 2 laps Arms and legs - straddle - 6 laps Just legs- 2 laps  Both - 4 laps    Cross   Country X 25  X 25 2 minutes 2 minutes      Scissors X 25  Combo x 20  X 25  Combo x 20 2 minutes  Combo - 2 minutes 2 minutes   Combo   2 minutes      Crab walk Seated on noodle in shallow - walking forward, backwards, and side stepping x 2 laps each   4 laps 4 laps    Lower abdominal   work  Knees to chest x 10  Knees to chest    x 10 Knees to chest x 10        Cardio          Jogging  Noodle under R foot rolling arch to touch toes then heel  X 8      Lap   Swimming    Frog kick - 4 lengths of pool   Flutter kick - 4 lengths of pool       Stretches On bench  On bench  On bench  On bench         Hamstrings X 3 B X 3 B X 3 B X 3 B      Heelcords X 3 B X 3 B X 3 B X 3 B  And on step R      Piriformis X 3 B X 3 B X 3 B X 3 B    R foot  By therapist    By therapist By therapist                   Treatment/Session Assessment:  Patient was assisted out of pool at edge of pool at bench end, so no steps used to exit pool. Noticing more movement in ankle with certain pool activities. No other complaints. Response to Treatment:  Coby Sell tolerated aquatic session. Encouraged patient to put foot on ground more and through out the day. Pain: Post Treatment Session: 5/10  · Compliance with Program/Exercises: Will assess as treatment progresses. · Recommendations/Intent for next treatment session: \"Next visit will focus on advancements to more challenging activities and aquatic therapy for improved weight bearing right lower extremity\".   Total Treatment Duration:   PT Patient Time In/Time Out  Time In: 1455  Time Out: [address], PTA

## 2018-01-23 ENCOUNTER — HOSPITAL ENCOUNTER (OUTPATIENT)
Dept: PHYSICAL THERAPY | Age: 33
Discharge: HOME | End: 2018-01-23
Payer: COMMERCIAL

## 2018-01-23 PROCEDURE — 97113 AQUATIC THERAPY/EXERCISES: CPT

## 2018-01-23 NOTE — PROGRESS NOTES
Lubna Elton  : 1985 Lubna Conde  : 1985  Payor: Juli Vigil / Plan: [place] [employer] [employer] [facility] / Product Type: PPO /  [address]  at [facility]  [address], [address], [address]  [phone]   [phone]              OUTPATIENT PHYSICAL THERAPY:Daily Note and Aquatic Note 2018    ICD-10: Treatment Diagnosis: pain in right foot (M79.671)  Difficulty in walking, not elsewhere classified (R26.2)  Precautions/Allergies:   None per Lubna Conde   Fall Risk Score: 1 (? 5 = High Risk)  MD Orders: Evaluate and treat MEDICAL/REFERRING DIAGNOSIS:  Pain, foot, right, chronic [M79.671, G89.29]  DATE OF ONSET: date of surgery 17  REFERRING PHYSICIAN: Juanis Zabala MD; Volodymyr Newsome MD  RETURN PHYSICIAN APPOINTMENT: none schedule at this time   Patient has attended 1 physical therapy session including initial evaluation. INITIAL ASSESSMENT:  Ms. Isabel Caputo presents with increased pain right foot and difficulty walking status post tarsal tunnel decompression on 17 with the [facility]. She had an ankle injury in 2017 that left her unable to bear weight through her right lower extremity. [facility] gave her a preliminary diagnosis of CRPS but then performed tarsal tunnel decompression right. She is cleared for weight bearing as tolerated status post procedure. She currently uses a knee scooter to get around. She holds her foot in dorsiflexed and inverted position. She does not like to touch her foot to floor. She presents with decreased range of motion right foot and ankle. She presents with decreased independence with functional mobility and activities of daily living. She would benefit from skilled physical therapy in order to restore prior level of function and prevent future impairment. PROBLEM LIST (Impacting functional limitations):  1. Decreased Strength  2. Decreased ADL/Functional Activities  3.  Decreased Transfer Abilities  4. Decreased Ambulation Ability/Technique  5. Decreased Balance  6. Increased Pain  7. Decreased Activity Tolerance  8. Decreased Pacing Skills  9. Decreased Work Simplification/Energy Conservation Techniques  10. Decreased Flexibility/Joint Mobility  11. Edema/Girth  12. Decreased [place] with Home Exercise Program INTERVENTIONS PLANNED:  1. Balance Exercise  2. Cold  3. Family Education  4. Gait Training  5. Heat  6. Home Exercise Program (HEP)  7. Manual Therapy  8. Neuromuscular Re-education/Strengthening  9. Range of Motion (ROM)  10. Therapeutic Activites  11. Therapeutic Exercise/Strengthening  12. Transcutaneous Electrical Nerve Stimulation (TENS)  13. Transfer Training  14. Ultrasound (US)  15. orthotic management and training   TREATMENT PLAN:  Effective Dates: 1/2/18 TO 4/2/18. Frequency/Duration: 2 times a week for 8 weeks  GOALS: (Goals have been discussed and agreed upon with patient.)  Discharge Goals: Time Frame: 8 weeks  1. Patient will be independent with home exercise program within 8 weeks in order to improve independence with management of patient's symptoms and/or functional deficits. 2. Patient will improve their foot and ankle ability measure score to 20/84 within 8 weeks in order to improve pain free independence with functional mobility. 3. Patient will ambulate with equal weight bearing with use of least restrictive device within 8 weeks in order to improve pain free independence with functional mobility. Rehabilitation Potential For Stated Goals: Good  Regarding Jessica Centeno's therapy, I certify that the treatment plan above will be carried out by a therapist or under their direction. Thank you for this referral,  Valencia Mcguire PTA     Referring Physician Signature:               Date                    The information in this section was collected on 1/2/18 (except where otherwise noted).   HISTORY:   History of Present Injury/Illness (Reason for Referral):  Ms. Jovanna Ha presents with increased pain right foot and difficulty walking status post tarsal tunnel decompression on 12/7/17 with the [facility]. She had an ankle injury in April 2017 that left her unable to bear weight through her right lower extremity. [facility] gave her a preliminary diagnosis of CRPS but then performed tarsal tunnel decompression right. She is cleared for weight bearing as tolerated status post procedure. She currently uses a knee scooter to get around. She holds her foot in dorsiflexed and inverted position. She does not like to touch her foot to floor. 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 Current Medications: vuvanse sonata, birth control, advil, tylenol    Date Last Reviewed:  1/23/2018   Number of Personal Factors/Comorbidities that affect the Plan of Care: 1-2: MODERATE COMPLEXITY   EXAMINATION:   Observation/Orthostatic Postural Assessment:  Assessed 1/2/18 Ada Bernal sits with forward head and rounded shoulders which indicate tight anterior chest musculature, upper trapezius, and levator scapula and weak posterior scapula musculature and deep cervical flexors. She holds her foot in dorsiflexed and inverted position.  She does not touch foot to floor when sitting  Palpation:  Assessed 1/2/18         Ada Bernal notes tenderness with palpation right plantar fascia, decreased myofascial mobility right foot/ankle and surrounding incision, increased edema right ankle/foot  ROM:  Assessed 1/2/18  Left ankle dorsiflexion active range of motion with knee flexed: 10 degrees  Left ankle dorsiflexion active range of motion with knee extended: 10 degrees  Left ankle dorsiflexion passive range of motion with knee extended: 20 degrees  Left ankle plantarflexion active range of motion: 35 degrees    Right ankle dorsiflexion active range of motion with knee flexed: 10 degrees  Rightt ankle dorsiflexion active range of motion with knee extended: 10 degrees  Right ankle dorsiflexion passive range of motion with knee extended: 12 degrees  Rightt ankle plantarflexion active range of motion: 30 degrees  At least 30 degrees of ankle inversion/eversion passive range of motion    Right first ray dorsiflexed with hypomobility plantarflexion   Strength:  Assessed 1/2/18         At least 3/5 strength right ankle - will continue to assess as appropriate  4+/5 left ankle and lower extremity all major muscle groups  Special Tests:    Neurological Screen: Assessed 1/2/18        Myotomes:  Strong left lower extremity, weak right ankle/foot due to recent procedure        Dermatomes: Altered sensation right foot/ankle        Reflexes:    Functional Mobility: Assessed 1/2/18 Umberto Martino is modified independent with functional mobility and activities of daily living with use of scooter, she notes she has tried to shift weight to right lower extremity with use of rolling walker but has difficulty  Balance:  Assessed 1/2/17         Poor standing dynamic balance - fair standing dynamic balance with use of scooter  Mental Status:  Assessed 1/2/17        Alert and oriented x 4   Body Structures Involved:  1. Nerves  2. Bones  3. Joints  4. Muscles  5. Ligaments Body Functions Affected:  1. Sensory/Pain  2. Neuromusculoskeletal  3. Movement Related  4. Skin Related Activities and Participation Affected:  1. General Tasks and Demands  2. Mobility  3. Self Care  4. Domestic Life  5.  Interpersonal Interactions and Relationships  6. Community, Social and [place]   Number of elements that affect the Plan of Care: 3: MODERATE COMPLEXITY   CLINICAL PRESENTATION:   Presentation: Evolving clinical presentation with changing clinical characteristics: MODERATE COMPLEXITY   CLINICAL DECISION MAKING:   Outcome Measure: Assessed 1/2/18  Tool Used: PT/OT FOOT AND ANKLE ABILITY MEASURE  Score:  Initial: 1/84    Interpretation of Score: For the \"Activities of Daily Living\", there are 21 questions each scored on a 5 point scale with 0 representing \"Unable to do\" and 4 representing \"No difficulty\". The lower the score, the greater the functional disability. 84/84 represents no disability. Minimal detectable change is 5.7 points. With the addition of the 8 questions in the \"Sports Subscale,\" there are 29 questions, each scored on a 5 point scale with 0 representing \"Unable to do\" and 4 representing \"No difficulty\". The lower the score, the greater the functional disability. 116/116 represents no disability. Minimal detectable change is 12.3 points. Activities of Daily Living:  Score 84 83-68 67-51 50-34 33-18 17-1 0   Modifier CH CI CJ CK CL CM CN     Medical Necessity:   · Patient is expected to demonstrate progress in strength, range of motion, balance, coordination and functional technique to increase independence with pain free functional mobility. · Patient demonstrates good rehab potential due to higher previous functional level. Reason for Services/Other Comments:  · Patient continues to require skilled intervention due to decreased independence with functional mobility. Use of outcome tool(s) and clinical judgement create a POC that gives a: Questionable prediction of patient's progress: MODERATE COMPLEXITY            TREATMENT:   (In addition to Assessment/Re-Assessment sessions the following treatments were rendered)  Pre-treatment Symptoms/Complaints:  Fernando Webb notes 4/10 pain at rest in R foot. She presents with boyfriend. Pain: Initial:     4/10 at rest      Aquatic Therapy ( 60  minutes): Aquatic treatment performed per flow grid for Decreased muscle strength, Decreased endurance and Decreased activity endurance. Cues provided for alignment and weight bearing. .  Assistance by therapist provided for patient to be able to weight bear on R LE.     Aquatic Exercise Log       Date  1-4-18 Date  1-11-18 Date  1-16-18 Date  1-18-18 Date  1-23-18   Activity/ Exercise Parameters Parameters Parameters Parameters Parameters   Walking forward 2 laps with rail and therapist  Seated on noodle - pulling forward x 2 laps Seated on noodle - 4 laps Seated on noodle - 4 laps Seated on noodle - 4 laps and 6 laps with rail and HHA in 3.5 ft   Walking backward  Seated on noodle - pushing backward x 2 laps Seated on noodle - 4 laps Seated on noodle - 4 laps Seated on noodle - 4 laps and 2 laps with rail and HHA  In 3.5 ft   Walking sideways In deeper water x 1 lap Seated on noodle - each direction x 2 laps Seated on noodle - wide knees and close knees- 2 laps each  Seated on noodle - wide leg step and close knees - both 2 laps each  Seated on noodle - wide and narrow knees - 2 laps each then 4 laps with rail in 3.5 ft     Marching   Standing in 4.5 feet  Tapping R foot to big step and back 2 x 10  Standing in 4.5 feet - tapping R foot fat on step and back to bottom of pool - keeping foot flat In place x 20      Goose Step          Tip toes          Heels          Lunges        Side step squats        LE Exercises Small noodle   noodle noodle noodle     Hip Flex/Ext Marching x 10 B  Marching x 15 R Marching x 15 R Marching x 15 R     Hip Abd/Add   X 15 R X 15 R X 15 R     Hip IR/ER          Leg press  Just getting foot on wall and rocking back and forth - 5 min        Knee Flex No noodle x 10 B    X 15 B     Squats     X 10      Leg Circles Deeper water x 10 each way each leg Deeper water x 10 each way each leg Deeper water x 10 each way each leg Deeper water x 10 each way each leg Deeper water x 10 each way each leg     Step Ups Small step x 10 B Small step   X 10 B   Red step 2 x 10 B    UE Exercises          Squeeze In          Push Down          Pull Down          Bicep/Tricep        Rows/Press outs         Chi Positions          Trunk Rotation        Deep H2O/ Noodles noodle Noodle and rings Noodle and rings Noodle and rings Noodle and or rings     Stabilization Core stability Cor stability  Core stability Core stability      Arms only  Straddle - 2 laps   Straddle - 2 laps     Legs only Bicycle- 2 laps Straddle - 2 laps   Both - 2 laps Arms and legs - straddle - 6 laps Just legs- 2 laps  Both - 4 laps Straddle - 2 laps  Both - 2 laps   Cross   Country X 25  X 25 2 minutes 2 minutes 2 min     Scissors X 25  Combo x 20  X 25  Combo x 20 2 minutes  Combo - 2 minutes 2 minutes   Combo   2 minutes 2 min  Combo x 2 min     Crab walk Seated on noodle in shallow - walking forward, backwards, and side stepping x 2 laps each   4 laps 4 laps 4 laps   Lower abdominal   work  Knees to chest x 10  Knees to chest    x 10 Knees to chest x 10   Knees to chest x 10      Cardio          Jogging  Noodle under R foot rolling arch to touch toes then heel  X 8   2 min in deeper water   Lap   Swimming    Frog kick - 4 lengths of pool   Flutter kick - 4 lengths of pool       Stretches On bench  On bench  On bench  On bench     on bench      Hamstrings X 3 B X 3 B X 3 B X 3 B X 3 B     Heelcords X 3 B X 3 B X 3 B X 3 B  And on step R X 3 B     Piriformis X 3 B X 3 B X 3 B X 3 B X 3 B   R foot  By therapist    By therapist By therapist  By therapist                 Treatment/Session Assessment:  Patient was assisted out of pool at edge of pool at bench end, so no steps used to exit pool. Worked on foot placement in water and stepping without squatting down.  Homework is to put foot on floor as much as possible rather than holding her leg up all the time.  Noticing more movement in ankle with certain pool activities. She did do all activities put forth today with no complaints. Response to Treatment:  Cecy Goetz tolerated aquatic session. Encouraged patient to put foot on ground more and through out the day. Pain: Post Treatment Session:5-6/10  · Compliance with Program/Exercises: Will assess as treatment progresses. · Recommendations/Intent for next treatment session: \"Next visit will focus on advancements to more challenging activities and aquatic therapy for improved weight bearing right lower extremity\".   Total Treatment Duration:   PT Patient Time In/Time Out  Time In: 1045  Time Out: 3960 Elias Monterroso PTA

## 2018-01-24 ENCOUNTER — HOSPITAL ENCOUNTER (OUTPATIENT)
Dept: PHYSICAL THERAPY | Age: 33
Discharge: HOME | End: 2018-01-24
Payer: COMMERCIAL

## 2018-01-24 PROCEDURE — 97140 MANUAL THERAPY 1/> REGIONS: CPT

## 2018-01-24 PROCEDURE — 97110 THERAPEUTIC EXERCISES: CPT

## 2018-01-24 NOTE — PROGRESS NOTES
Theoplis Hick  : 1985 Theoplis Hick  : 1985  Payor: Donna Jones / Plan: [facility] CROSS OF [facility] / Product Type: PPO /  [id number]  at [facility]  [address] [facility], [place] [address] [address] [facility]  [phone]   [phone]              OUTPATIENT PHYSICAL THERAPY: Progress Note 2018    ICD-10: Treatment Diagnosis: pain in right foot (M79.671)  Difficulty in walking, not elsewhere classified (R26.2)  Precautions/Allergies:   None per Theoplis Hick   Fall Risk Score: 1 (? 5 = High Risk)  MD Orders: Evaluate and treat MEDICAL/REFERRING DIAGNOSIS:  Pain, foot, right, chronic [M79.671, G89.29]  DATE OF ONSET: date of surgery 17  REFERRING PHYSICIAN: Dayo Barajas MD; Chastity Daly MD  RETURN PHYSICIAN APPOINTMENT: none schedule at this time   Patient has attended 8 physical therapy session including initial evaluation. INITIAL ASSESSMENT:  Ms. Julianna Espino presents with improving pain right foot and continued difficulty walking status post tarsal tunnel decompression on 17 with the [facility]. She had an ankle injury in 2017 that left her unable to bear weight through her right lower extremity. [facility] gave her a preliminary diagnosis of CRPS but then performed tarsal tunnel decompression right. She is cleared for weight bearing as tolerated status post procedure. She currently uses a knee scooter to get around. She holds her foot in dorsiflexed and inverted position - but positioning is decreasing at rest. She does not like to touch her foot to floor. She presents with improving range of motion right foot and ankle. She presents with decreased independence with functional mobility and activities of daily living. She would benefit from skilled physical therapy in order to restore prior level of function and prevent future impairment. PROBLEM LIST (Impacting functional limitations):  1. Decreased Strength  2.  Decreased ADL/Functional Activities  3. Decreased Transfer Abilities  4. Decreased Ambulation Ability/Technique  5. Decreased Balance  6. Increased Pain  7. Decreased Activity Tolerance  8. Decreased Pacing Skills  9. Decreased Work Simplification/Energy Conservation Techniques  10. Decreased Flexibility/Joint Mobility  11. Edema/Girth  12. Decreased [place] with Home Exercise Program INTERVENTIONS PLANNED:  1. Balance Exercise  2. Cold  3. Family Education  4. Gait Training  5. Heat  6. Home Exercise Program (HEP)  7. Manual Therapy  8. Neuromuscular Re-education/Strengthening  9. Range of Motion (ROM)  10. Therapeutic Activites  11. Therapeutic Exercise/Strengthening  12. Transcutaneous Electrical Nerve Stimulation (TENS)  13. Transfer Training  14. Ultrasound (US)  15. orthotic management and training   TREATMENT PLAN:  Effective Dates: 1/2/18 TO 4/2/18. Frequency/Duration: 2 times a week for 8 weeks  GOALS: (Goals have been discussed and agreed upon with patient.)  Discharge Goals: Time Frame: 8 weeks  1. Patient will be independent with home exercise program within 8 weeks in order to improve independence with management of patient's symptoms and/or functional deficits. (CONTINUE 1/24/18)  2. Patient will improve their foot and ankle ability measure score to 20/84 within 8 weeks in order to improve pain free independence with functional mobility. (CONTINUE 1/24/18)  3. Patient will ambulate with equal weight bearing with use of least restrictive device within 8 weeks in order to improve pain free independence with functional mobility. (CONTINUE 1/24/18)  Rehabilitation Potential For Stated Goals: Good              The information in this section was collected on 1/2/18 (except where otherwise noted). HISTORY:   History of Present Injury/Illness (Reason for Referral):  Ms. Ilana Roldan presents with increased pain right foot and difficulty walking status post tarsal tunnel decompression on 12/7/17 with the [facility]. She had an ankle injury in April 2017 that left her unable to bear weight through her right lower extremity. [address] gave her a preliminary diagnosis of CRPS but then performed tarsal tunnel decompression right. She is cleared for weight bearing as tolerated status post procedure. She currently uses a knee scooter to get around. She holds her foot in dorsiflexed and inverted position. She does not like to touch her foot to floor. 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 Current Medications: vuvanse sonata, birth control, advil, tylenol    Date Last Reviewed:  1/24/2018   Number of Personal Factors/Comorbidities that affect the Plan of Care: 1-2: MODERATE COMPLEXITY   EXAMINATION:   Observation/Orthostatic Postural Assessment:  Assessed 1/24/18 Annamaria Bianchi sits with forward head and rounded shoulders which indicate tight anterior chest musculature, upper trapezius, and levator scapula and weak posterior scapula musculature and deep cervical flexors. She holds her foot in dorsiflexed and inverted position - decreased guarded positioning.  She does not touch foot to floor when sitting  Palpation:  Assessed 1/2/18         Annamaria Bianchi notes tenderness with palpation right plantar fascia, decreased myofascial mobility right foot/ankle and surrounding incision, increased edema right ankle/foot  ROM:  Assessed 1/24/18  Left ankle dorsiflexion active range of motion with knee flexed: 10 degrees  Left ankle dorsiflexion active range of motion with knee extended: 10 degrees  Left ankle dorsiflexion passive range of motion with knee extended: 20 degrees  Left ankle plantarflexion active range of motion: 35 degrees    Right ankle dorsiflexion active range of motion with knee flexed: 10 degrees  Rightt ankle dorsiflexion active range of motion with knee extended: 10 degrees  Right ankle dorsiflexion passive range of motion with knee extended: 15 degrees  Rightt ankle plantarflexion active range of motion: 30 degrees  At least 30 degrees of ankle inversion/eversion passive range of motion    Right first ray dorsiflexed with hypomobility plantarflexion   Strength:  Assessed 1/24/18         At least 3/5 strength right ankle - will continue to assess as appropriate  4+/5 left ankle and lower extremity all major muscle groups  Special Tests:    Neurological Screen: Assessed 1/2/18        Myotomes:  Strong left lower extremity, weak right ankle/foot due to recent procedure        Dermatomes: Altered sensation right foot/ankle        Reflexes:    Functional Mobility: Assessed 1/24/18 Robles Arizmendi is modified independent with functional mobility and activities of daily living with use of scooter, she notes she has tried to shift weight to right lower extremity with use of rolling walker but has difficulty - still not attempting to ambulate with assistive device   Balance:  Assessed 1/2/17         Poor standing dynamic balance - fair standing dynamic balance with use of scooter  Mental Status:  Assessed 1/2/17        Alert and oriented x 4   Body Structures Involved:  1. Nerves  2. Bones  3. Joints  4. Muscles  5. Ligaments Body Functions Affected:  1. Sensory/Pain  2. Neuromusculoskeletal  3. Movement Related  4. Skin Related Activities and Participation Affected:  1. General Tasks and Demands  2. Mobility  3. Self Care  4. Domestic Life  5. Interpersonal Interactions and Relationships  6.  Community, Social and [place] Number of elements that affect the Plan of Care: 3: MODERATE COMPLEXITY   CLINICAL PRESENTATION:   Presentation: Evolving clinical presentation with changing clinical characteristics: MODERATE COMPLEXITY   CLINICAL DECISION MAKING:   Outcome Measure: Assessed 1/24/18  Tool Used: PT/OT FOOT AND ANKLE ABILITY MEASURE  Score:  Initial: 1/84 3/84   Interpretation of Score: For the \"Activities of Daily Living\", there are 21 questions each scored on a 5 point scale with 0 representing \"Unable to do\" and 4 representing \"No difficulty\". The lower the score, the greater the functional disability. 84/84 represents no disability. Minimal detectable change is 5.7 points. With the addition of the 8 questions in the \"Sports Subscale,\" there are 29 questions, each scored on a 5 point scale with 0 representing \"Unable to do\" and 4 representing \"No difficulty\". The lower the score, the greater the functional disability. 116/116 represents no disability. Minimal detectable change is 12.3 points. Activities of Daily Living:  Score 84 83-68 67-51 50-34 33-18 17-1 0   Modifier CH CI CJ CK CL CM CN     Medical Necessity:   · Patient is expected to demonstrate progress in strength, range of motion, balance, coordination and functional technique to increase independence with pain free functional mobility. · Patient demonstrates good rehab potential due to higher previous functional level. Reason for Services/Other Comments:  · Patient continues to require skilled intervention due to decreased independence with functional mobility.    Use of outcome tool(s) and clinical judgement create a POC that gives a: Questionable prediction of patient's progress: MODERATE COMPLEXITY            TREATMENT:   (In addition to Assessment/Re-Assessment sessions the following treatments were rendered)  Pre-treatment Symptoms/Complaints:  Cuate Vargas notes 3/10 pain right foot an ankle   Pain: Initial:   Pain Intensity 1: 3  Pain Location 1: Foot, Ankle  Pain Orientation 1: Right  Pain Intervention(s) 1: Exercise, Therapeutic touch      (In addition to Assessment/Re-Assessment sessions the following treatments were rendered)  Therapeutic Exercise: (27 Minutes):  Exercises per grid below to improve mobility, strength and coordination. Required minimal visual and verbal cues to promote proper body alignment and promote proper body posture. Progressed range and complexity of movement as indicated. 1. Gentle active range of motion right for improved mobility and strength - ABC, dorsiflexion/plantarflexion, inversion/eversion, circles     2. Gentle passive dorsiflexion stretching with towel with foot inverted to reduce tibial nerve tension 3 sets 30 seconds right    3. Tibial nerve gliding technique with foot plantarflexed and inverted and short arc quads 20 repetitions right     4. Gentle weight bearing dorsiflexion stretch in sitting with toes on towel roll and foot inverted 3 sets 30 seconds right     5. NuStep for close chain dynamic mobility right ankle/foot level 1 8 minutes     6. Ankle strengthening with yellow theraband resistance - dorsiflexion, plantarflexion, eversion, inversion 20 repetitions     7. Standing in parallel bars with attempting to put weight through right lower extremity 2 minutes    8. Ambulation in parallel bars with equal step lengths 2 sets 6 feet with excessive use of upper extremities on parallel bars to reduce weight through right lower extremity     Manual Therapy (    Soft Tissue Mobilization Duration  Duration: 15 Minutes):    Myofascial release to right ankle/foot for improved mobility, muscle relaxation and decreased pain   Cross friction massage to right ankle incision to prevent adhesions   First ray plantarflexion/dorsiflexion mobilization for improved mobility   Plantar fascia cross friction massage for improved mobility     Therapeutic Modalities: for decreased pain and inflammation                       Right Ankle Cold  Type: Cold/ice pack  Duration : 10 minutes  Patient Position: Supine                                                                         Treatment/Session Assessment:    Response to Treatment:  Cecy Goetz notes 2/10 pain following therapy session. Pain: Post Treatment Session: 2/10  · Compliance with Program/Exercises: Will assess as treatment progresses. · Recommendations/Intent for next treatment session: \"Next visit will focus on advancements to more challenging activities and aquatic therapy for improved weight bearing right lower extremity\".   Total Treatment Duration:  PT Patient Time In/Time Out  Time In: 1100  Time Out: [address], PT

## 2018-01-25 ENCOUNTER — HOSPITAL ENCOUNTER (OUTPATIENT)
Dept: PHYSICAL THERAPY | Age: 33
Discharge: HOME | End: 2018-01-25
Payer: COMMERCIAL

## 2018-01-25 PROCEDURE — 97113 AQUATIC THERAPY/EXERCISES: CPT

## 2018-01-25 NOTE — PROGRESS NOTES
Rohini Lilliam  : 1985 Rohini Vyas  : 1985  Payor: Julia Parikh / Plan: [facility] OF [facility] / Product Type: PPO /  [id number]  at [facility]  [address] [facility], [place] [address] [address] [facility] [place]  [phone]   [phone]              OUTPATIENT PHYSICAL THERAPY: Daily Note and Aquatic Note 2018    ICD-10: Treatment Diagnosis: pain in right foot (M79.671)  Difficulty in walking, not elsewhere classified (R26.2)  Precautions/Allergies:   None per Rohini Vyas   Fall Risk Score: 1 (? 5 = High Risk)  MD Orders: Evaluate and treat MEDICAL/REFERRING DIAGNOSIS:  Pain, foot, right, chronic [E01.956, G89.29]  DATE OF ONSET: date of surgery 17  REFERRING PHYSICIAN: Gabby Silverio MD; Mike Prabhakar MD  RETURN PHYSICIAN APPOINTMENT: none schedule at this time   Patient has attended 8 physical therapy session including initial evaluation. INITIAL ASSESSMENT:  Ms. Lei Beck presents with improving pain right foot and continued difficulty walking status post tarsal tunnel decompression on 17 with the [facility]. She had an ankle injury in 2017 that left her unable to bear weight through her right lower extremity. [facility] gave her a preliminary diagnosis of CRPS but then performed tarsal tunnel decompression right. She is cleared for weight bearing as tolerated status post procedure. She currently uses a knee scooter to get around. She holds her foot in dorsiflexed and inverted position - but positioning is decreasing at rest. She does not like to touch her foot to floor. She presents with improving range of motion right foot and ankle. She presents with decreased independence with functional mobility and activities of daily living. She would benefit from skilled physical therapy in order to restore prior level of function and prevent future impairment. PROBLEM LIST (Impacting functional limitations):  1. Decreased Strength  2.  Decreased ADL/Functional Activities  3. Decreased Transfer Abilities  4. Decreased Ambulation Ability/Technique  5. Decreased Balance  6. Increased Pain  7. Decreased Activity Tolerance  8. Decreased Pacing Skills  9. Decreased Work Simplification/Energy Conservation Techniques  10. Decreased Flexibility/Joint Mobility  11. Edema/Girth  12. Decreased [place] with Home Exercise Program INTERVENTIONS PLANNED:  1. Balance Exercise  2. Cold  3. Family Education  4. Gait Training  5. Heat  6. Home Exercise Program (HEP)  7. Manual Therapy  8. Neuromuscular Re-education/Strengthening  9. Range of Motion (ROM)  10. Therapeutic Activites  11. Therapeutic Exercise/Strengthening  12. Transcutaneous Electrical Nerve Stimulation (TENS)  13. Transfer Training  14. Ultrasound (US)  15. orthotic management and training   TREATMENT PLAN:  Effective Dates: 1/2/18 TO 4/2/18. Frequency/Duration: 2 times a week for 8 weeks  GOALS: (Goals have been discussed and agreed upon with patient.)  Discharge Goals: Time Frame: 8 weeks  1. Patient will be independent with home exercise program within 8 weeks in order to improve independence with management of patient's symptoms and/or functional deficits. (CONTINUE 1/24/18)  2. Patient will improve their foot and ankle ability measure score to 20/84 within 8 weeks in order to improve pain free independence with functional mobility. (CONTINUE 1/24/18)  3. Patient will ambulate with equal weight bearing with use of least restrictive device within 8 weeks in order to improve pain free independence with functional mobility. (CONTINUE 1/24/18)  Rehabilitation Potential For Stated Goals: Good              The information in this section was collected on 1/2/18 (except where otherwise noted). HISTORY:   History of Present Injury/Illness (Reason for Referral):  Ms. Sabas Chilel presents with increased pain right foot and difficulty walking status post tarsal tunnel decompression on 12/7/17 with the [facility]. She had an ankle injury in April 2017 that left her unable to bear weight through her right lower extremity. [facility] gave her a preliminary diagnosis of CRPS but then performed tarsal tunnel decompression right. She is cleared for weight bearing as tolerated status post procedure. She currently uses a knee scooter to get around. She holds her foot in dorsiflexed and inverted position. She does not like to touch her foot to floor. 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 Current Medications: vuvanse sonata, birth control, advil, tylenol    Date Last Reviewed:  1/25/2018   Number of Personal Factors/Comorbidities that affect the Plan of Care: 1-2: MODERATE COMPLEXITY   EXAMINATION:   Observation/Orthostatic Postural Assessment:  Assessed 1/24/18 Cecy Goetz sits with forward head and rounded shoulders which indicate tight anterior chest musculature, upper trapezius, and levator scapula and weak posterior scapula musculature and deep cervical flexors. She holds her foot in dorsiflexed and inverted position - decreased guarded positioning.  She does not touch foot to floor when sitting  Palpation:  Assessed 1/2/18         Cecy Goetz notes tenderness with palpation right plantar fascia, decreased myofascial mobility right foot/ankle and surrounding incision, increased edema right ankle/foot  ROM:  Assessed 1/24/18  Left ankle dorsiflexion active range of motion with knee flexed: 10 degrees  Left ankle dorsiflexion active range of motion with knee extended: 10 degrees  Left ankle dorsiflexion passive range of motion with knee extended: 20 degrees  Left ankle plantarflexion active range of motion: 35 degrees    Right ankle dorsiflexion active range of motion with knee flexed: 10 degrees  Rightt ankle dorsiflexion active range of motion with knee extended: 10 degrees  Right ankle dorsiflexion passive range of motion with knee extended: 15 degrees  Rightt ankle plantarflexion active range of motion: 30 degrees  At least 30 degrees of ankle inversion/eversion passive range of motion    Right first ray dorsiflexed with hypomobility plantarflexion   Strength:  Assessed 1/24/18         At least 3/5 strength right ankle - will continue to assess as appropriate  4+/5 left ankle and lower extremity all major muscle groups  Special Tests:    Neurological Screen: Assessed 1/2/18        Myotomes:  Strong left lower extremity, weak right ankle/foot due to recent procedure        Dermatomes: Altered sensation right foot/ankle        Reflexes:    Functional Mobility: Assessed 1/24/18 Fernando Webb is modified independent with functional mobility and activities of daily living with use of scooter, she notes she has tried to shift weight to right lower extremity with use of rolling walker but has difficulty - still not attempting to ambulate with assistive device   Balance:  Assessed 1/2/17         Poor standing dynamic balance - fair standing dynamic balance with use of scooter  Mental Status:  Assessed 1/2/17        Alert and oriented x 4   Body Structures Involved:  1. Nerves  2. Bones  3. Joints  4. Muscles  5. Ligaments Body Functions Affected:  1. Sensory/Pain  2. Neuromusculoskeletal  3. Movement Related  4. Skin Related Activities and Participation Affected:  1. General Tasks and Demands  2. Mobility  3. Self Care  4. Domestic Life  5. Interpersonal Interactions and Relationships  6.  Community, Social and [place] Number of elements that affect the Plan of Care: 3: MODERATE COMPLEXITY   CLINICAL PRESENTATION:   Presentation: Evolving clinical presentation with changing clinical characteristics: MODERATE COMPLEXITY   CLINICAL DECISION MAKING:   Outcome Measure: Assessed 1/24/18  Tool Used: PT/OT FOOT AND ANKLE ABILITY MEASURE  Score:  Initial: 1/84 3/84   Interpretation of Score: For the \"Activities of Daily Living\", there are 21 questions each scored on a 5 point scale with 0 representing \"Unable to do\" and 4 representing \"No difficulty\". The lower the score, the greater the functional disability. 84/84 represents no disability. Minimal detectable change is 5.7 points. With the addition of the 8 questions in the \"Sports Subscale,\" there are 29 questions, each scored on a 5 point scale with 0 representing \"Unable to do\" and 4 representing \"No difficulty\". The lower the score, the greater the functional disability. 116/116 represents no disability. Minimal detectable change is 12.3 points. Activities of Daily Living:  Score 84 83-68 67-51 50-34 33-18 17-1 0   Modifier CH CI CJ CK CL CM CN     Medical Necessity:   · Patient is expected to demonstrate progress in strength, range of motion, balance, coordination and functional technique to increase independence with pain free functional mobility. · Patient demonstrates good rehab potential due to higher previous functional level. Reason for Services/Other Comments:  · Patient continues to require skilled intervention due to decreased independence with functional mobility. Use of outcome tool(s) and clinical judgement create a POC that gives a: Questionable prediction of patient's progress: MODERATE COMPLEXITY            TREATMENT:   (In addition to Assessment/Re-Assessment sessions the following treatments were rendered)  Pre-treatment Symptoms/Complaints:  Edmond Peres notes 4-5/10 pain right foot an ankle.   She reports it hurting more with the increased activities. Pain: Initial:     4-5/10     Aquatic Therapy ( 45  minutes): Aquatic treatment performed per flow grid for Decreased muscle strength, Decreased endurance and Decreased activity endurance. Cues provided for alignment and weight bearing.  .  Assistance by therapist provided for patient to be able to weight bear on R LE.     Aquatic Exercise Log       Date  1-4-18 Date  1-11-18 Date  1-16-18 Date  1-18-18 Date  1-23-18 1-25-18   Activity/ Exercise Parameters Parameters Parameters Parameters Parameters    Walking forward 2 laps with rail and therapist  Seated on noodle - pulling forward x 2 laps Seated on noodle - 4 laps Seated on noodle - 4 laps Seated on noodle - 4 laps and 6 laps with rail and HHA in 3.5 ft Seated on noodle - 4 laps   Walking with rail and HHA - 6 laps   Walking backward   Seated on noodle - pushing backward x 2 laps Seated on noodle - 4 laps Seated on noodle - 4 laps Seated on noodle - 4 laps and 2 laps with rail and HHA  In 3.5 ft Seated on noodle - 4 laps   Walking with rail and HHA - 2 laps    Walking sideways In deeper water x 1 lap Seated on noodle - each direction x 2 laps Seated on noodle - wide knees and close knees- 2 laps each  Seated on noodle - wide leg step and close knees - both 2 laps each  Seated on noodle - wide and narrow knees - 2 laps each then 4 laps with rail in 3.5 ft Walking in shallow and deeper - 4 laps  With rail      Marching     Standing in 4.5 feet  Tapping R foot to big step and back 2 x 10  Standing in 4.5 feet - tapping R foot fat on step and back to bottom of pool - keeping foot flat In place x 20  In place tapping foot x 10 B      Goose Step                 Tip toes                 Heels                 Lunges              Side step squats              LE Exercises Small noodle    noodle noodle noodle       Hip Flex/Ext Marching x 10 B   Marching x 15 R Marching x 15 R Marching x 15 R       Hip Abd/Add     X 15 R X 15 R X 15 R       Hip IR/ER                 Leg press   Just getting foot on wall and rocking back and forth - 5 min       Against wall - both feet x 10   With R only pushing through heel - x 10       Knee Flex No noodle x 10 B       X 15 B       Squats         X 10        Leg Circles Deeper water x 10 each way each leg Deeper water x 10 each way each leg Deeper water x 10 each way each leg Deeper water x 10 each way each leg Deeper water x 10 each way each leg Deeper water x 10 each way each leg      Step Ups Small step x 10 B Small step   X 10 B     Red step 2 x 10 B  Big step x 10    UE Exercises                 Squeeze In                 Push Down                 Pull Down                 Bicep/Tricep              Rows/Press outs               Chi Positions                 Trunk Rotation              Deep H2O/ Noodles noodle Noodle and rings Noodle and rings Noodle and rings Noodle and or rings Noodle and rings      Stabilization Core stability Cor stability   Core stability Core stability        Arms only   Straddle - 2 laps     Straddle - 2 laps Straddle - 2 laps      Legs only Bicycle- 2 laps Straddle - 2 laps   Both - 2 laps Arms and legs - straddle - 6 laps Just legs- 2 laps  Both - 4 laps Straddle - 2 laps  Both - 2 laps Straddle - 2 laps   Both - 4 laps   Cross   Country X 25  X 25 2 minutes 2 minutes 2 min 2.5 min      Scissors X 25  Combo x 20  X 25  Combo x 20 2 minutes  Combo - 2 minutes 2 minutes   Combo   2 minutes 2 min  Combo x 2 min 2.5 min  Combo   2.5 min      Crab walk Seated on noodle in shallow - walking forward, backwards, and side stepping x 2 laps each    4 laps 4 laps 4 laps 4 laps    Lower abdominal   work  Knees to chest x 10  Knees to chest    x 10 Knees to chest x 10    Knees to chest x 10        Cardio                 Jogging   Noodle under R foot rolling arch to touch toes then heel  X 8     2 min in deeper water    Lap   Swimming       Frog kick - 4 lengths of pool   Flutter kick - 4 lengths of pool       Stretches On bench  On bench  On bench  On bench   on bench  On bench       Hamstrings X 3 B X 3 B X 3 B X 3 B X 3 B X 3 B      Heelcords X 3 B X 3 B X 3 B X 3 B  And on step R X 3 B X 3 B      Piriformis X 3 B X 3 B X 3 B X 3 B X 3 B X 3 B   R foot  By therapist    By therapist By therapist   By therapist By therapist                            Treatment/Session Assessment:  Patient required cues to tighten and engage quad when standing on R LE. When she did this she had less pain in ankle. She did well with advancing more walking laps and timed activities in deeper water for endurance. We agreed together that in 2 weeks from today that she will use the steps with both feet to enter and exit the pool. Response to Treatment:  Abiola Roldan notes 6/10 pain following therapy session. Pain: Post Treatment Session: 6/10  · Compliance with Program/Exercises: Will assess as treatment progresses. · Recommendations/Intent for next treatment session: \"Next visit will focus on advancements to more challenging activities and aquatic therapy for improved weight bearing right lower extremity\".   Total Treatment Duration: 45 minutes   PT Patient Time In/Time Out  Time In: 1045  Time Out: [address], PTA

## 2018-01-30 ENCOUNTER — HOSPITAL ENCOUNTER (OUTPATIENT)
Dept: PHYSICAL THERAPY | Age: 33
Discharge: HOME | End: 2018-01-30
Payer: COMMERCIAL

## 2018-01-30 PROCEDURE — 97113 AQUATIC THERAPY/EXERCISES: CPT

## 2018-01-30 NOTE — PROGRESS NOTES
Cece Streeter  : 1985 Cece Streeter  : 1985  Payor: Margie Alter / Plan: [place] OF [address] [place] [address] Rd / Product Type: PPO /  [address]  at 614 [facility] 68, [facility], [address]  [phone]   [phone]              OUTPATIENT PHYSICAL THERAPY: Daily Note and Aquatic Note 2018    ICD-10: Treatment Diagnosis: pain in right foot (M79.671)  Difficulty in walking, not elsewhere classified (R26.2)  Precautions/Allergies:   None per Cece Streeter   Fall Risk Score: 1 (? 5 = High Risk)  MD Orders: Evaluate and treat MEDICAL/REFERRING DIAGNOSIS:  Pain, foot, right, chronic [M14.040, G89.29]  DATE OF ONSET: date of surgery 17  REFERRING PHYSICIAN: Evelyn Gustafson MD; Gricel Elias MD  RETURN PHYSICIAN APPOINTMENT: none schedule at this time   Patient has attended 8 physical therapy session including initial evaluation. INITIAL ASSESSMENT:  Ms. Georgette Moya presents with improving pain right foot and continued difficulty walking status post tarsal tunnel decompression on 17 with the [facility]. She had an ankle injury in 2017 that left her unable to bear weight through her right lower extremity. [facility] gave her a preliminary diagnosis of CRPS but then performed tarsal tunnel decompression right. She is cleared for weight bearing as tolerated status post procedure. She currently uses a knee scooter to get around. She holds her foot in dorsiflexed and inverted position - but positioning is decreasing at rest. She does not like to touch her foot to floor. She presents with improving range of motion right foot and ankle. She presents with decreased independence with functional mobility and activities of daily living. She would benefit from skilled physical therapy in order to restore prior level of function and prevent future impairment. PROBLEM LIST (Impacting functional limitations):  1. Decreased Strength  2.  Decreased ADL/Functional Activities  3. Decreased Transfer Abilities  4. Decreased Ambulation Ability/Technique  5. Decreased Balance  6. Increased Pain  7. Decreased Activity Tolerance  8. Decreased Pacing Skills  9. Decreased Work Simplification/Energy Conservation Techniques  10. Decreased Flexibility/Joint Mobility  11. Edema/Girth  12. Decreased [place] with Home Exercise Program INTERVENTIONS PLANNED:  1. Balance Exercise  2. Cold  3. Family Education  4. Gait Training  5. Heat  6. Home Exercise Program (HEP)  7. Manual Therapy  8. Neuromuscular Re-education/Strengthening  9. Range of Motion (ROM)  10. Therapeutic Activites  11. Therapeutic Exercise/Strengthening  12. Transcutaneous Electrical Nerve Stimulation (TENS)  13. Transfer Training  14. Ultrasound (US)  15. orthotic management and training   TREATMENT PLAN:  Effective Dates: 1/2/18 TO 4/2/18. Frequency/Duration: 2 times a week for 8 weeks  GOALS: (Goals have been discussed and agreed upon with patient.)  Discharge Goals: Time Frame: 8 weeks  1. Patient will be independent with home exercise program within 8 weeks in order to improve independence with management of patient's symptoms and/or functional deficits. (CONTINUE 1/24/18)  2. Patient will improve their foot and ankle ability measure score to 20/84 within 8 weeks in order to improve pain free independence with functional mobility. (CONTINUE 1/24/18)  3. Patient will ambulate with equal weight bearing with use of least restrictive device within 8 weeks in order to improve pain free independence with functional mobility. (CONTINUE 1/24/18)  Rehabilitation Potential For Stated Goals: Good              The information in this section was collected on 1/2/18 (except where otherwise noted). HISTORY:   History of Present Injury/Illness (Reason for Referral):  Ms. Messi Webb presents with increased pain right foot and difficulty walking status post tarsal tunnel decompression on 12/7/17 with the [facility]. She had an ankle injury in April 2017 that left her unable to bear weight through her right lower extremity. [facility] gave her a preliminary diagnosis of CRPS but then performed tarsal tunnel decompression right. She is cleared for weight bearing as tolerated status post procedure. She currently uses a knee scooter to get around. She holds her foot in dorsiflexed and inverted position. She does not like to touch her foot to floor. 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 Current Medications: vuvanse sonata, birth control, advil, tylenol    Date Last Reviewed:  1/30/2018   Number of Personal Factors/Comorbidities that affect the Plan of Care: 1-2: MODERATE COMPLEXITY   EXAMINATION:   Observation/Orthostatic Postural Assessment:  Assessed 1/24/18 Camron Rich sits with forward head and rounded shoulders which indicate tight anterior chest musculature, upper trapezius, and levator scapula and weak posterior scapula musculature and deep cervical flexors. She holds her foot in dorsiflexed and inverted position - decreased guarded positioning.  She does not touch foot to floor when sitting  Palpation:  Assessed 1/2/18         Camron Rich notes tenderness with palpation right plantar fascia, decreased myofascial mobility right foot/ankle and surrounding incision, increased edema right ankle/foot  ROM:  Assessed 1/24/18  Left ankle dorsiflexion active range of motion with knee flexed: 10 degrees  Left ankle dorsiflexion active range of motion with knee extended: 10 degrees  Left ankle dorsiflexion passive range of motion with knee extended: 20 degrees  Left ankle plantarflexion active range of motion: 35 degrees    Right ankle dorsiflexion active range of motion with knee flexed: 10 degrees  Rightt ankle dorsiflexion active range of motion with knee extended: 10 degrees  Right ankle dorsiflexion passive range of motion with knee extended: 15 degrees  Rightt ankle plantarflexion active range of motion: 30 degrees  At least 30 degrees of ankle inversion/eversion passive range of motion    Right first ray dorsiflexed with hypomobility plantarflexion   Strength:  Assessed 1/24/18         At least 3/5 strength right ankle - will continue to assess as appropriate  4+/5 left ankle and lower extremity all major muscle groups  Special Tests:    Neurological Screen: Assessed 1/2/18        Myotomes:  Strong left lower extremity, weak right ankle/foot due to recent procedure        Dermatomes: Altered sensation right foot/ankle        Reflexes:    Functional Mobility: Assessed 1/24/18 Anna Byrne is modified independent with functional mobility and activities of daily living with use of scooter, she notes she has tried to shift weight to right lower extremity with use of rolling walker but has difficulty - still not attempting to ambulate with assistive device   Balance:  Assessed 1/2/17         Poor standing dynamic balance - fair standing dynamic balance with use of scooter  Mental Status:  Assessed 1/2/17        Alert and oriented x 4   Body Structures Involved:  1. Nerves  2. Bones  3. Joints  4. Muscles  5. Ligaments Body Functions Affected:  1. Sensory/Pain  2. Neuromusculoskeletal  3. Movement Related  4. Skin Related Activities and Participation Affected:  1. General Tasks and Demands  2. Mobility  3. Self Care  4. Domestic Life  5. Interpersonal Interactions and Relationships  6.  Community, Social and [place] Number of elements that affect the Plan of Care: 3: MODERATE COMPLEXITY   CLINICAL PRESENTATION:   Presentation: Evolving clinical presentation with changing clinical characteristics: MODERATE COMPLEXITY   CLINICAL DECISION MAKING:   Outcome Measure: Assessed 1/24/18  Tool Used: PT/OT FOOT AND ANKLE ABILITY MEASURE  Score:  Initial: 1/84 3/84   Interpretation of Score: For the \"Activities of Daily Living\", there are 21 questions each scored on a 5 point scale with 0 representing \"Unable to do\" and 4 representing \"No difficulty\". The lower the score, the greater the functional disability. 84/84 represents no disability. Minimal detectable change is 5.7 points. With the addition of the 8 questions in the \"Sports Subscale,\" there are 29 questions, each scored on a 5 point scale with 0 representing \"Unable to do\" and 4 representing \"No difficulty\". The lower the score, the greater the functional disability. 116/116 represents no disability. Minimal detectable change is 12.3 points. Activities of Daily Living:  Score 84 83-68 67-51 50-34 33-18 17-1 0   Modifier CH CI CJ CK CL CM CN     Medical Necessity:   · Patient is expected to demonstrate progress in strength, range of motion, balance, coordination and functional technique to increase independence with pain free functional mobility. · Patient demonstrates good rehab potential due to higher previous functional level. Reason for Services/Other Comments:  · Patient continues to require skilled intervention due to decreased independence with functional mobility. Use of outcome tool(s) and clinical judgement create a POC that gives a: Questionable prediction of patient's progress: MODERATE COMPLEXITY            TREATMENT:   (In addition to Assessment/Re-Assessment sessions the following treatments were rendered)  Pre-treatment Symptoms/Complaints:  Rivka Dianakemal notes 4/10 pain right foot an ankle.   She asked if pain  Is normal.  I talked with her regarding increased activity, walking in water, and increased range exercises that yes it is normal to have some pain. Pain: Initial:     4/10     Aquatic Therapy ( 45  minutes): Aquatic treatment performed per flow grid for Decreased muscle strength, Decreased endurance and Decreased activity endurance. Cues provided for alignment and weight bearing.  .  Assistance by therapist provided for patient to be able to weight bear on R LE.     Aquatic Exercise Log       Date  1-4-18 Date  1-11-18 Date  1-16-18 Date  1-18-18 Date  1-23-18 1-25-18 1-30-18   Activity/ Exercise Parameters Parameters Parameters Parameters Parameters     Walking forward 2 laps with rail and therapist  Seated on noodle - pulling forward x 2 laps Seated on noodle - 4 laps Seated on noodle - 4 laps Seated on noodle - 4 laps and 6 laps with rail and HHA in 3.5 ft Seated on noodle - 4 laps   Walking with rail and HHA - 6 laps Seated on noodle - 4 laps   Walking 8 laps with rail and HHA   Walking backward   Seated on noodle - pushing backward x 2 laps Seated on noodle - 4 laps Seated on noodle - 4 laps Seated on noodle - 4 laps and 2 laps with rail and HHA  In 3.5 ft Seated on noodle - 4 laps   Walking with rail and HHA - 2 laps  Seated on noodle - 4 laps    Walking sideways In deeper water x 1 lap Seated on noodle - each direction x 2 laps Seated on noodle - wide knees and close knees- 2 laps each  Seated on noodle - wide leg step and close knees - both 2 laps each  Seated on noodle - wide and narrow knees - 2 laps each then 4 laps with rail in 3.5 ft Walking in shallow and deeper - 4 laps  With rail Seated on noodle - 2 positions - 4 laps each      Marching     Standing in 4.5 feet  Tapping R foot to big step and back 2 x 10  Standing in 4.5 feet - tapping R foot fat on step and back to bottom of pool - keeping foot flat In place x 20  In place tapping foot x 10 B       Goose Step                  Tip toes                  Heels                  Lunges               Side step squats               LE Exercises Small noodle    noodle noodle noodle        Hip Flex/Ext Marching x 10 B   Marching x 15 R Marching x 15 R Marching x 15 R        Hip Abd/Add     X 15 R X 15 R X 15 R        Hip IR/ER                  Leg press   Just getting foot on wall and rocking back and forth - 5 min       Against wall - both feet x 10   With R only pushing through heel - x 10        Knee Flex No noodle x 10 B       X 15 B  Knee flexed - R quad stretch with noodle       Squats         X 10         Leg Circles Deeper water x 10 each way each leg Deeper water x 10 each way each leg Deeper water x 10 each way each leg Deeper water x 10 each way each leg Deeper water x 10 each way each leg Deeper water x 10 each way each leg Deeper water x 10 each way each leg      Step Ups Small step x 10 B Small step   X 10 B     Red step 2 x 10 B  Big step x 10  Big step   X 10 B   UE Exercises                  Squeeze In                  Push Down                  Pull Down                  Bicep/Tricep               Rows/Press outs                Chi Positions                  Trunk Rotation               Deep H2O/ Noodles noodle Noodle and rings Noodle and rings Noodle and rings Noodle and or rings Noodle and rings Noodle and rings      Stabilization Core stability Cor stability   Core stability Core stability         Arms only   Straddle - 2 laps     Straddle - 2 laps Straddle - 2 laps Straddle - 2 laps       Legs only Bicycle- 2 laps Straddle - 2 laps   Both - 2 laps Arms and legs - straddle - 6 laps Just legs- 2 laps  Both - 4 laps Straddle - 2 laps  Both - 2 laps Straddle - 2 laps   Both - 4 laps Straddle - 4 laps   Both - 2 laps    Cross   Country X 25  X 25 2 minutes 2 minutes 2 min 2.5 min 2.5 min      Scissors X 25  Combo x 20  X 25  Combo x 20 2 minutes  Combo - 2 minutes 2 minutes   Combo   2 minutes 2 min  Combo x 2 min 2.5 min  Combo   2.5 min 2.5 min      Crab walk Seated on noodle in shallow - walking forward, backwards, and side stepping x 2 laps each    4 laps 4 laps 4 laps 4 laps  4 laps    Lower abdominal   work  Knees to chest x 10  Knees to chest    x 10 Knees to chest x 10    Knees to chest x 10   Knees to chest   2 x 10       Cardio                  Jogging   Noodle under R foot rolling arch to touch toes then heel  X 8     2 min in deeper water  In deep - 3 min   Lap   Swimming       Frog kick - 4 lengths of pool   Flutter kick - 4 lengths of pool           Stretches On bench  On bench  On bench  On bench   on bench  On bench  On bench       Hamstrings X 3 B X 3 B X 3 B X 3 B X 3 B X 3 B X 3 B      Heelcords X 3 B X 3 B X 3 B X 3 B  And on step R X 3 B X 3 B X 3 B      Piriformis X 3 B X 3 B X 3 B X 3 B X 3 B X 3 B X 3 B   R foot  By therapist    By therapist By therapist   By therapist By therapist By therapist                             Treatment/Session Assessment:  Patient remembered to engage quad when standing on R LE for walking. She did not have any increase in pain throughout session. She was full of questions today. Did notice a little more range with ankle which she was able to keep arch of R foot down versus on outer side of R foot when stepping. [employer] Response to Treatment:  Ada Bernal notes 5/10 pain following therapy session. Pain: Post Treatment Session: 5/10  · Compliance with Program/Exercises: Will assess as treatment progresses. · Recommendations/Intent for next treatment session: \"Next visit will focus on advancements to more challenging activities and aquatic therapy for improved weight bearing right lower extremity\".   Total Treatment Duration: 45 minutes   PT Patient Time In/Time Out  Time In: 1000  Time Out: [address] [place], [place]

## 2018-01-31 ENCOUNTER — HOSPITAL ENCOUNTER (OUTPATIENT)
Dept: PHYSICAL THERAPY | Age: 33
Discharge: HOME | End: 2018-01-31
Payer: COMMERCIAL

## 2018-01-31 PROCEDURE — 97110 THERAPEUTIC EXERCISES: CPT

## 2018-01-31 PROCEDURE — 97140 MANUAL THERAPY 1/> REGIONS: CPT

## 2018-01-31 NOTE — PROGRESS NOTES
[facility]  : 1985 [facility]  : 1985  Payor: Troy Naqvi / Plan: [place] [facility] / Product Type: PPO /  [id number]  at [facility]  [address], [address], [place] [address] [address] [facility]  [phone]   [phone]              OUTPATIENT PHYSICAL THERAPY: Daily Note 2018    ICD-10: Treatment Diagnosis: pain in right foot (M79.671)  Difficulty in walking, not elsewhere classified (R26.2)  Precautions/Allergies:   None per [facility]   Fall Risk Score: 1 (? 5 = High Risk)  MD Orders: Evaluate and treat MEDICAL/REFERRING DIAGNOSIS:  Pain, foot, right, chronic [M79.671, G89.29]  DATE OF ONSET: date of surgery 17  REFERRING PHYSICIAN: Deidra Camilo MD; Richelle Patrick MD  RETURN PHYSICIAN APPOINTMENT: none schedule at this time   Patient has attended 11 physical therapy session including initial evaluation. INITIAL ASSESSMENT:  Ms. Malika Rivera presents with improving pain right foot and continued difficulty walking status post tarsal tunnel decompression on 17 with the [facility]. She had an ankle injury in 2017 that left her unable to bear weight through her right lower extremity. [facility] gave her a preliminary diagnosis of CRPS but then performed tarsal tunnel decompression right. She is cleared for weight bearing as tolerated status post procedure. She currently uses a knee scooter to get around. She holds her foot in dorsiflexed and inverted position - but positioning is decreasing at rest. She does not like to touch her foot to floor. She presents with improving range of motion right foot and ankle. She presents with decreased independence with functional mobility and activities of daily living. She would benefit from skilled physical therapy in order to restore prior level of function and prevent future impairment. PROBLEM LIST (Impacting functional limitations):  1. Decreased Strength  2.  Decreased ADL/Functional Activities  3. Decreased Transfer Abilities  4. Decreased Ambulation Ability/Technique  5. Decreased Balance  6. Increased Pain  7. Decreased Activity Tolerance  8. Decreased Pacing Skills  9. Decreased Work Simplification/Energy Conservation Techniques  10. Decreased Flexibility/Joint Mobility  11. Edema/Girth  12. Decreased Meherrin with Home Exercise Program INTERVENTIONS PLANNED:  1. Balance Exercise  2. Cold  3. Family Education  4. Gait Training  5. Heat  6. Home Exercise Program (HEP)  7. Manual Therapy  8. Neuromuscular Re-education/Strengthening  9. Range of Motion (ROM)  10. Therapeutic Activites  11. Therapeutic Exercise/Strengthening  12. Transcutaneous Electrical Nerve Stimulation (TENS)  13. Transfer Training  14. Ultrasound (US)  15. orthotic management and training   TREATMENT PLAN:  Effective Dates: 1/2/18 TO 4/2/18. Frequency/Duration: 2 times a week for 8 weeks  GOALS: (Goals have been discussed and agreed upon with patient.)  Discharge Goals: Time Frame: 8 weeks  1. Patient will be independent with home exercise program within 8 weeks in order to improve independence with management of patient's symptoms and/or functional deficits. (CONTINUE 1/24/18)  2. Patient will improve their foot and ankle ability measure score to 20/84 within 8 weeks in order to improve pain free independence with functional mobility. (CONTINUE 1/24/18)  3. Patient will ambulate with equal weight bearing with use of least restrictive device within 8 weeks in order to improve pain free independence with functional mobility. (CONTINUE 1/24/18)  Rehabilitation Potential For Stated Goals: Good              The information in this section was collected on 1/2/18 (except where otherwise noted). HISTORY:   History of Present Injury/Illness (Reason for Referral):  Ms. Malika Rivera presents with increased pain right foot and difficulty walking status post tarsal tunnel decompression on 12/7/17 with the [facility].  She had an ankle injury in April 2017 that left her unable to bear weight through her right lower extremity. [address] gave her a preliminary diagnosis of CRPS but then performed tarsal tunnel decompression right. She is cleared for weight bearing as tolerated status post procedure. She currently uses a knee scooter to get around. She holds her foot in dorsiflexed and inverted position. She does not like to touch her foot to floor. 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 Current Medications: vuvanse sonata, birth control, advil, tylenol    Date Last Reviewed:  1/31/2018   Number of Personal Factors/Comorbidities that affect the Plan of Care: 1-2: MODERATE COMPLEXITY   EXAMINATION:   Observation/Orthostatic Postural Assessment:  Assessed 1/24/18 Dessie Schilder sits with forward head and rounded shoulders which indicate tight anterior chest musculature, upper trapezius, and levator scapula and weak posterior scapula musculature and deep cervical flexors. She holds her foot in dorsiflexed and inverted position - decreased guarded positioning.  She does not touch foot to floor when sitting  Palpation:  Assessed 1/2/18         Dessie Schilder notes tenderness with palpation right plantar fascia, decreased myofascial mobility right foot/ankle and surrounding incision, increased edema right ankle/foot  ROM:  Assessed 1/24/18  Left ankle dorsiflexion active range of motion with knee flexed: 10 degrees  Left ankle dorsiflexion active range of motion with knee extended: 10 degrees  Left ankle dorsiflexion passive range of motion with knee extended: 20 degrees  Left ankle plantarflexion active range of motion: 35 degrees    Right ankle dorsiflexion active range of motion with knee flexed: 10 degrees  Rightt ankle dorsiflexion active range of motion with knee extended: 10 degrees  Right ankle dorsiflexion passive range of motion with knee extended: 15 degrees  Rightt ankle plantarflexion active range of motion: 30 degrees  At least 30 degrees of ankle inversion/eversion passive range of motion    Right first ray dorsiflexed with hypomobility plantarflexion   Strength:  Assessed 1/24/18         At least 3/5 strength right ankle - will continue to assess as appropriate  4+/5 left ankle and lower extremity all major muscle groups  Special Tests:    Neurological Screen: Assessed 1/2/18        Myotomes:  Strong left lower extremity, weak right ankle/foot due to recent procedure        Dermatomes: Altered sensation right foot/ankle        Reflexes:    Functional Mobility: Assessed 1/24/18 Umberto Martino is modified independent with functional mobility and activities of daily living with use of scooter, she notes she has tried to shift weight to right lower extremity with use of rolling walker but has difficulty - still not attempting to ambulate with assistive device   Balance:  Assessed 1/2/17         Poor standing dynamic balance - fair standing dynamic balance with use of scooter  Mental Status:  Assessed 1/2/17        Alert and oriented x 4   Body Structures Involved:  1. Nerves  2. Bones  3. Joints  4. Muscles  5. Ligaments Body Functions Affected:  1. Sensory/Pain  2. Neuromusculoskeletal  3. Movement Related  4. Skin Related Activities and Participation Affected:  1. General Tasks and Demands  2. Mobility  3. Self Care  4. Domestic Life  5. Interpersonal Interactions and Relationships  6.  Community, Social and [place]   Number of elements that affect the Plan of Care: 3: MODERATE COMPLEXITY   CLINICAL PRESENTATION:   Presentation: Evolving clinical presentation with changing clinical characteristics: MODERATE COMPLEXITY   CLINICAL DECISION MAKING:   Outcome Measure: Assessed 1/24/18  Tool Used: PT/OT FOOT AND ANKLE ABILITY MEASURE  Score:  Initial: 1/84 3/84   Interpretation of Score: For the \"Activities of Daily Living\", there are 21 questions each scored on a 5 point scale with 0 representing \"Unable to do\" and 4 representing \"No difficulty\". The lower the score, the greater the functional disability. 84/84 represents no disability. Minimal detectable change is 5.7 points. With the addition of the 8 questions in the \"Sports Subscale,\" there are 29 questions, each scored on a 5 point scale with 0 representing \"Unable to do\" and 4 representing \"No difficulty\". The lower the score, the greater the functional disability. 116/116 represents no disability. Minimal detectable change is 12.3 points. Activities of Daily Living:  Score 84 83-68 67-51 50-34 33-18 17-1 0   Modifier CH CI CJ CK CL CM CN     Medical Necessity:   · Patient is expected to demonstrate progress in strength, range of motion, balance, coordination and functional technique to increase independence with pain free functional mobility. · Patient demonstrates good rehab potential due to higher previous functional level. Reason for Services/Other Comments:  · Patient continues to require skilled intervention due to decreased independence with functional mobility.    Use of outcome tool(s) and clinical judgement create a POC that gives a: Questionable prediction of patient's progress: MODERATE COMPLEXITY            TREATMENT:   (In addition to Assessment/Re-Assessment sessions the following treatments were rendered)  Pre-treatment Symptoms/Complaints:  Obdulia Carmona notes 4/10 pain right foot an ankle - still only uses scooter for functional mobility in home and in community   Pain: Initial:   Pain Intensity 1: 4  Pain Location 1: Foot  Pain Orientation 1: Right  Pain Intervention(s) 1: Exercise, Therapeutic touch      (In addition to Assessment/Re-Assessment sessions the following treatments were rendered)  Therapeutic Exercise: (29 Minutes):  Exercises per grid below to improve mobility, strength and coordination. Required minimal visual and verbal cues to promote proper body alignment and promote proper body posture. Progressed range and complexity of movement as indicated. 1. Gentle active range of motion right for improved mobility and strength - ABC, dorsiflexion/plantarflexion, inversion/eversion, circles     2. Gentle passive dorsiflexion stretching with towel with foot inverted to reduce tibial nerve tension 3 sets 30 seconds right    3. Tibial nerve gliding technique with foot plantarflexed and inverted and short arc quads 20 repetitions right     4. Gentle weight bearing dorsiflexion stretch in sitting with incline board and foot inverted 3 sets 30 seconds right     5. NuStep for close chain dynamic mobility right ankle/foot level 1 8 minutes     6. Ankle strengthening with yellow theraband resistance - dorsiflexion, plantarflexion, eversion, inversion 20 repetitions     7. Standing in parallel bars with attempting to put weight through right lower extremity 2 minutes    8. Ambulation with rolling walker with cues for equal step lengths 2 sets 100 feet - she placed foot on floor but no weightbearing right lower extremity     9. Sitting on jada disc with right foot on floor and left LAQ and marching for weightbearing through right lower extremity and dynamic stability right ankle        Manual Therapy (    Soft Tissue Mobilization Duration  Duration: 14 Minutes):    Myofascial release to right ankle/foot for improved mobility, muscle relaxation and decreased pain   Cross friction massage to right ankle incision to prevent adhesions   First ray plantarflexion/dorsiflexion mobilization for improved mobility   Plantar fascia cross friction massage for improved mobility     Therapeutic Modalities: for decreased pain and inflammation                       Right Ankle Cold  Type: Cold/ice pack  Duration : 10 minutes  Patient Position: Supine                                                                         Treatment/Session Assessment:    Response to Treatment:  Lori Bernal notes 5/10 pain following therapy session. Pain: Post Treatment Session: 5/10  · Compliance with Program/Exercises: Will assess as treatment progresses. · Recommendations/Intent for next treatment session: \"Next visit will focus on advancements to more challenging activities and aquatic therapy for improved weight bearing right lower extremity\".   Total Treatment Duration:  PT Patient Time In/Time Out  Time In: 1300  Time Out: [facility],

## 2018-02-01 ENCOUNTER — HOSPITAL ENCOUNTER (OUTPATIENT)
Dept: PHYSICAL THERAPY | Age: 33
Discharge: HOME | End: 2018-02-01
Payer: COMMERCIAL

## 2018-02-01 PROCEDURE — 97113 AQUATIC THERAPY/EXERCISES: CPT

## 2018-02-01 NOTE — PROGRESS NOTES
Cecy Goetz  : 1985 Cecy Goetz  : 1985  Payor: Sloane Meehan / Plan: [facility] CROSS OF Kunal Foster  / Product Type: PPO /  [address]  at [facility]  [address] [facility], [place] [address] [address] [facility]  [phone]   [phone]              OUTPATIENT PHYSICAL THERAPY: Daily Note and Aquatic Note 2018    ICD-10: Treatment Diagnosis: pain in right foot (M79.671)  Difficulty in walking, not elsewhere classified (R26.2)  Precautions/Allergies:   None per Cecy Goetz   Fall Risk Score: 1 (? 5 = High Risk)  MD Orders: Evaluate and treat MEDICAL/REFERRING DIAGNOSIS:  Pain, foot, right, chronic [M79.671, G89.29]  DATE OF ONSET: date of surgery 17  REFERRING PHYSICIAN: Roberto Galarza MD; Falguni Munoz MD  RETURN PHYSICIAN APPOINTMENT: none schedule at this time   Patient has attended 8 physical therapy session including initial evaluation. INITIAL ASSESSMENT:  Ms. Reza Mcmahan presents with improving pain right foot and continued difficulty walking status post tarsal tunnel decompression on 17 with the [facility]. She had an ankle injury in 2017 that left her unable to bear weight through her right lower extremity. [facility] gave her a preliminary diagnosis of CRPS but then performed tarsal tunnel decompression right. She is cleared for weight bearing as tolerated status post procedure. She currently uses a knee scooter to get around. She holds her foot in dorsiflexed and inverted position - but positioning is decreasing at rest. She does not like to touch her foot to floor. She presents with improving range of motion right foot and ankle. She presents with decreased independence with functional mobility and activities of daily living. She would benefit from skilled physical therapy in order to restore prior level of function and prevent future impairment. PROBLEM LIST (Impacting functional limitations):  1. Decreased Strength  2.  Decreased ADL/Functional Activities  3. Decreased Transfer Abilities  4. Decreased Ambulation Ability/Technique  5. Decreased Balance  6. Increased Pain  7. Decreased Activity Tolerance  8. Decreased Pacing Skills  9. Decreased Work Simplification/Energy Conservation Techniques  10. Decreased Flexibility/Joint Mobility  11. Edema/Girth  12. Decreased [place] with Home Exercise Program INTERVENTIONS PLANNED:  1. Balance Exercise  2. Cold  3. Family Education  4. Gait Training  5. Heat  6. Home Exercise Program (HEP)  7. Manual Therapy  8. Neuromuscular Re-education/Strengthening  9. Range of Motion (ROM)  10. Therapeutic Activites  11. Therapeutic Exercise/Strengthening  12. Transcutaneous Electrical Nerve Stimulation (TENS)  13. Transfer Training  14. Ultrasound (US)  15. orthotic management and training   TREATMENT PLAN:  Effective Dates: 1/2/18 TO 4/2/18. Frequency/Duration: 2 times a week for 8 weeks  GOALS: (Goals have been discussed and agreed upon with patient.)  Discharge Goals: Time Frame: 8 weeks  1. Patient will be independent with home exercise program within 8 weeks in order to improve independence with management of patient's symptoms and/or functional deficits. (CONTINUE 1/24/18)  2. Patient will improve their foot and ankle ability measure score to 20/84 within 8 weeks in order to improve pain free independence with functional mobility. (CONTINUE 1/24/18)  3. Patient will ambulate with equal weight bearing with use of least restrictive device within 8 weeks in order to improve pain free independence with functional mobility. (CONTINUE 1/24/18)  Rehabilitation Potential For Stated Goals: Good              The information in this section was collected on 1/2/18 (except where otherwise noted). HISTORY:   History of Present Injury/Illness (Reason for Referral):  Ms. Isabel Caputo presents with increased pain right foot and difficulty walking status post tarsal tunnel decompression on 12/7/17 with the [facility]. She had an ankle injury in April 2017 that left her unable to bear weight through her right lower extremity. [address] gave her a preliminary diagnosis of CRPS but then performed tarsal tunnel decompression right. She is cleared for weight bearing as tolerated status post procedure. She currently uses a knee scooter to get around. She holds her foot in dorsiflexed and inverted position. She does not like to touch her foot to floor. 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 Current Medications: vuvanse sonata, birth control, advil, tylenol    Date Last Reviewed:  2/1/2018   Number of Personal Factors/Comorbidities that affect the Plan of Care: 1-2: MODERATE COMPLEXITY   EXAMINATION:   Observation/Orthostatic Postural Assessment:  Assessed 1/24/18 Ada Bernal sits with forward head and rounded shoulders which indicate tight anterior chest musculature, upper trapezius, and levator scapula and weak posterior scapula musculature and deep cervical flexors. She holds her foot in dorsiflexed and inverted position - decreased guarded positioning.  She does not touch foot to floor when sitting  Palpation:  Assessed 1/2/18         Ada Bernal notes tenderness with palpation right plantar fascia, decreased myofascial mobility right foot/ankle and surrounding incision, increased edema right ankle/foot  ROM:  Assessed 1/24/18  Left ankle dorsiflexion active range of motion with knee flexed: 10 degrees  Left ankle dorsiflexion active range of motion with knee extended: 10 degrees  Left ankle dorsiflexion passive range of motion with knee extended: 20 degrees  Left ankle plantarflexion active range of motion: 35 degrees    Right ankle dorsiflexion active range of motion with knee flexed: 10 degrees  Rightt ankle dorsiflexion active range of motion with knee extended: 10 degrees  Right ankle dorsiflexion passive range of motion with knee extended: 15 degrees  Rightt ankle plantarflexion active range of motion: 30 degrees  At least 30 degrees of ankle inversion/eversion passive range of motion    Right first ray dorsiflexed with hypomobility plantarflexion   Strength:  Assessed 1/24/18         At least 3/5 strength right ankle - will continue to assess as appropriate  4+/5 left ankle and lower extremity all major muscle groups  Special Tests:    Neurological Screen: Assessed 1/2/18        Myotomes:  Strong left lower extremity, weak right ankle/foot due to recent procedure        Dermatomes: Altered sensation right foot/ankle        Reflexes:    Functional Mobility: Assessed 1/24/18 Enriqueta Dutton is modified independent with functional mobility and activities of daily living with use of scooter, she notes she has tried to shift weight to right lower extremity with use of rolling walker but has difficulty - still not attempting to ambulate with assistive device   Balance:  Assessed 1/2/17         Poor standing dynamic balance - fair standing dynamic balance with use of scooter  Mental Status:  Assessed 1/2/17        Alert and oriented x 4   Body Structures Involved:  1. Nerves  2. Bones  3. Joints  4. Muscles  5. Ligaments Body Functions Affected:  1. Sensory/Pain  2. Neuromusculoskeletal  3. Movement Related  4. Skin Related Activities and Participation Affected:  1. General Tasks and Demands  2. Mobility  3. Self Care  4. Domestic Life  5. Interpersonal Interactions and Relationships  6.  Community, Social and [place] Number of elements that affect the Plan of Care: 3: MODERATE COMPLEXITY   CLINICAL PRESENTATION:   Presentation: Evolving clinical presentation with changing clinical characteristics: MODERATE COMPLEXITY   CLINICAL DECISION MAKING:   Outcome Measure: Assessed 1/24/18  Tool Used: PT/OT FOOT AND ANKLE ABILITY MEASURE  Score:  Initial: 1/84 3/84   Interpretation of Score: For the \"Activities of Daily Living\", there are 21 questions each scored on a 5 point scale with 0 representing \"Unable to do\" and 4 representing \"No difficulty\". The lower the score, the greater the functional disability. 84/84 represents no disability. Minimal detectable change is 5.7 points. With the addition of the 8 questions in the \"Sports Subscale,\" there are 29 questions, each scored on a 5 point scale with 0 representing \"Unable to do\" and 4 representing \"No difficulty\". The lower the score, the greater the functional disability. 116/116 represents no disability. Minimal detectable change is 12.3 points. Activities of Daily Living:  Score 84 83-68 67-51 50-34 33-18 17-1 0   Modifier CH CI CJ CK CL CM CN     Medical Necessity:   · Patient is expected to demonstrate progress in strength, range of motion, balance, coordination and functional technique to increase independence with pain free functional mobility. · Patient demonstrates good rehab potential due to higher previous functional level. Reason for Services/Other Comments:  · Patient continues to require skilled intervention due to decreased independence with functional mobility. Use of outcome tool(s) and clinical judgement create a POC that gives a: Questionable prediction of patient's progress: MODERATE COMPLEXITY            TREATMENT:   (In addition to Assessment/Re-Assessment sessions the following treatments were rendered)  Pre-treatment Symptoms/Complaints:  Jr Shruthi notes pain to be 5/10. She also presents with a huge bruise on the posterior R thigh.  She reports she rolled out of bed and landed on her scooter. Pain: Initial:     5/10     Aquatic Therapy ( 45  minutes): Aquatic treatment performed per flow grid for Decreased muscle strength, Decreased endurance and Decreased activity endurance. Cues provided for alignment and weight bearing. .  Assistance by therapist provided for patient to be able to weight bear on R LE.     Aquatic Exercise Log       Date  1-4-18 Date  1-11-18 Date  1-16-18 Date  1-18-18 Date  1-23-18 1-25-18 1-30-18 2-1-18   Activity/ Exercise Parameters Parameters Parameters Parameters Parameters      Walking forward 2 laps with rail and therapist  Seated on noodle - pulling forward x 2 laps Seated on noodle - 4 laps Seated on noodle - 4 laps Seated on noodle - 4 laps and 6 laps with rail and HHA in 3.5 ft Seated on noodle - 4 laps   Walking with rail and HHA - 6 laps Seated on noodle - 4 laps   Walking 8 laps with rail and HHA Seated on noodle - 4 laps   Walking - 6 laps with rail and HHA - emphasis on putting weight through foot.     Walking backward   Seated on noodle - pushing backward x 2 laps Seated on noodle - 4 laps Seated on noodle - 4 laps Seated on noodle - 4 laps and 2 laps with rail and HHA  In 3.5 ft Seated on noodle - 4 laps   Walking with rail and HHA - 2 laps  Seated on noodle - 4 laps  Seated on noodle - 4 laps    Walking sideways In deeper water x 1 lap Seated on noodle - each direction x 2 laps Seated on noodle - wide knees and close knees- 2 laps each  Seated on noodle - wide leg step and close knees - both 2 laps each  Seated on noodle - wide and narrow knees - 2 laps each then 4 laps with rail in 3.5 ft Walking in shallow and deeper - 4 laps  With rail Seated on noodle - 2 positions - 4 laps each Seated on noodle - 2 laps in wide steps  2 laps in narrow steps      Marching     Standing in 4.5 feet  Tapping R foot to big step and back 2 x 10  Standing in 4.5 feet - tapping R foot fat on step and back to bottom of pool - keeping foot flat In place x 20  In place tapping foot x 10 B        Goose Step                   Tip toes                   Heels                   Lunges                Side step squats                LE Exercises Small noodle    noodle noodle noodle         Hip Flex/Ext Marching x 10 B   Marching x 15 R Marching x 15 R Marching x 15 R         Hip Abd/Add     X 15 R X 15 R X 15 R         Hip IR/ER                   Leg press   Just getting foot on wall and rocking back and forth - 5 min       Against wall - both feet x 10   With R only pushing through heel - x 10   Against wall   X 10 B   X 5 R      Knee Flex No noodle x 10 B       X 15 B  Knee flexed - R quad stretch with noodle  Knee flexed - R quad stretch       Squats         X 10          Leg Circles Deeper water x 10 each way each leg Deeper water x 10 each way each leg Deeper water x 10 each way each leg Deeper water x 10 each way each leg Deeper water x 10 each way each leg Deeper water x 10 each way each leg Deeper water x 10 each way each leg Deeper water x 10 each way each leg      Step Ups Small step x 10 B Small step   X 10 B     Red step 2 x 10 B  Big step x 10  Big step   X 10 B Big step ups x 10  First step coming out x 10 leading with R with therapist foot under hers to encourage weight bearing   UE Exercises                   Squeeze In                   Push Down                   Pull Down                   Bicep/Tricep                Rows/Press outs                 Chi Positions                   Trunk Rotation                Deep H2O/ Noodles noodle Noodle and rings Noodle and rings Noodle and rings Noodle and or rings Noodle and rings Noodle and rings Noodle and rings      Stabilization Core stability Cor stability   Core stability Core stability          Arms only   Straddle - 2 laps     Straddle - 2 laps Straddle - 2 laps Straddle - 2 laps  Straddle -  2 laps      Legs only Bicycle- 2 laps Straddle - 2 laps   Both - 2 laps Arms and legs - straddle - 6 laps Just legs- 2 laps  Both - 4 laps Straddle - 2 laps  Both - 2 laps Straddle - 2 laps   Both - 4 laps Straddle - 4 laps   Both - 2 laps  Straddle - 4 laps   Both - 2 laps    Cross   Country X 25  X 25 2 minutes 2 minutes 2 min 2.5 min 2.5 min       Scissors X 25  Combo x 20  X 25  Combo x 20 2 minutes  Combo - 2 minutes 2 minutes   Combo   2 minutes 2 min  Combo x 2 min 2.5 min  Combo   2.5 min 2.5 min       Crab walk Seated on noodle in shallow - walking forward, backwards, and side stepping x 2 laps each    4 laps 4 laps 4 laps 4 laps  4 laps  4 laps   Lower abdominal   work  Knees to chest x 10  Knees to chest    x 10 Knees to chest x 10    Knees to chest x 10   Knees to chest   2 x 10  SLS activities with lots of encouragement to do task.       Cardio             Weight shifting to SLS to each side x 10       Jogging   Noodle under R foot rolling arch to touch toes then heel  X 8     2 min in deeper water  In deep - 3 min    Lap   Swimming       Frog kick - 4 lengths of pool   Flutter kick - 4 lengths of pool            Stretches On bench  On bench  On bench  On bench   on bench  On bench  On bench  At wall       Hamstrings X 3 B X 3 B X 3 B X 3 B X 3 B X 3 B X 3 B X 3 B      Heelcords X 3 B X 3 B X 3 B X 3 B  And on step R X 3 B X 3 B X 3 B X 3 B      Piriformis X 3 B X 3 B X 3 B X 3 B X 3 B X 3 B X 3 B    R foot  By therapist    By therapist By therapist   By therapist By therapist By therapist                               Treatment/Session Assessment:  Patient was encouraged throughout session to put more weight on foot now in water so we can be ready to perform on land. She does report it is very scary for her. She is putting foot down more in water when told that itis the task at hand, but does not do it voluntarily. Response to Treatment:  Ranjit Morfin notes 6/10 pain following therapy session. Pain: Post Treatment Session: 6/10  · Compliance with Program/Exercises:  Will assess as treatment progresses. · Recommendations/Intent for next treatment session: \"Next visit will focus on advancements to more challenging activities and aquatic therapy for improved weight bearing right lower extremity\".   Total Treatment Duration: 45 minutes   PT Patient Time In/Time Out  Time In: 1000  Time Out: [address] [place], [place]

## 2018-02-06 ENCOUNTER — HOSPITAL ENCOUNTER (OUTPATIENT)
Dept: PHYSICAL THERAPY | Age: 33
Discharge: HOME | End: 2018-02-06
Payer: COMMERCIAL

## 2018-02-06 PROCEDURE — 97113 AQUATIC THERAPY/EXERCISES: CPT

## 2018-02-06 NOTE — PROGRESS NOTES
[facility]  : 1985 [facility]  : 1985  Payor: Troy Naqvi / Plan: NANCY LOCO Mariana Nunez / Product Type: PPO /  [id number]  at [facility]  [address], [address], [place] [address] [address] [facility]  [phone]   [phone]              OUTPATIENT PHYSICAL THERAPY: Daily Note and Aquatic Note 2018    ICD-10: Treatment Diagnosis: pain in right foot (M79.671)  Difficulty in walking, not elsewhere classified (R26.2)  Precautions/Allergies:   None per [facility]   Fall Risk Score: 1 (? 5 = High Risk)  MD Orders: Evaluate and treat MEDICAL/REFERRING DIAGNOSIS:  Pain, foot, right, chronic [M79.671, G89.29]  DATE OF ONSET: date of surgery 17  REFERRING PHYSICIAN: Deidra Camilo MD; Richelle Patrick MD  RETURN PHYSICIAN APPOINTMENT: none schedule at this time   Patient has attended 8 physical therapy session including initial evaluation. INITIAL ASSESSMENT:  Ms. Malika Rivera presents with improving pain right foot and continued difficulty walking status post tarsal tunnel decompression on 17 with the [facility]. She had an ankle injury in 2017 that left her unable to bear weight through her right lower extremity. [facility] gave her a preliminary diagnosis of CRPS but then performed tarsal tunnel decompression right. She is cleared for weight bearing as tolerated status post procedure. She currently uses a knee scooter to get around. She holds her foot in dorsiflexed and inverted position - but positioning is decreasing at rest. She does not like to touch her foot to floor. She presents with improving range of motion right foot and ankle. She presents with decreased independence with functional mobility and activities of daily living. She would benefit from skilled physical therapy in order to restore prior level of function and prevent future impairment. PROBLEM LIST (Impacting functional limitations):  1. Decreased Strength  2.  Decreased ADL/Functional Activities  3. Decreased Transfer Abilities  4. Decreased Ambulation Ability/Technique  5. Decreased Balance  6. Increased Pain  7. Decreased Activity Tolerance  8. Decreased Pacing Skills  9. Decreased Work Simplification/Energy Conservation Techniques  10. Decreased Flexibility/Joint Mobility  11. Edema/Girth  12. Decreased [place] with Home Exercise Program INTERVENTIONS PLANNED:  1. Balance Exercise  2. Cold  3. Family Education  4. Gait Training  5. Heat  6. Home Exercise Program (HEP)  7. Manual Therapy  8. Neuromuscular Re-education/Strengthening  9. Range of Motion (ROM)  10. Therapeutic Activites  11. Therapeutic Exercise/Strengthening  12. Transcutaneous Electrical Nerve Stimulation (TENS)  13. Transfer Training  14. Ultrasound (US)  15. orthotic management and training   TREATMENT PLAN:  Effective Dates: 1/2/18 TO 4/2/18. Frequency/Duration: 2 times a week for 8 weeks  GOALS: (Goals have been discussed and agreed upon with patient.)  Discharge Goals: Time Frame: 8 weeks  1. Patient will be independent with home exercise program within 8 weeks in order to improve independence with management of patient's symptoms and/or functional deficits. (CONTINUE 1/24/18)  2. Patient will improve their foot and ankle ability measure score to 20/84 within 8 weeks in order to improve pain free independence with functional mobility. (CONTINUE 1/24/18)  3. Patient will ambulate with equal weight bearing with use of least restrictive device within 8 weeks in order to improve pain free independence with functional mobility. (CONTINUE 1/24/18)  Rehabilitation Potential For Stated Goals: Good              The information in this section was collected on 1/2/18 (except where otherwise noted). HISTORY:   History of Present Injury/Illness (Reason for Referral):  Ms. Kristen Jaquez presents with increased pain right foot and difficulty walking status post tarsal tunnel decompression on 12/7/17 with the [facility]. She had an ankle injury in April 2017 that left her unable to bear weight through her right lower extremity. [facility] gave her a preliminary diagnosis of CRPS but then performed tarsal tunnel decompression right. She is cleared for weight bearing as tolerated status post procedure. She currently uses a knee scooter to get around. She holds her foot in dorsiflexed and inverted position. She does not like to touch her foot to floor. 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 Current Medications: vuvanse sonata, birth control, advil, tylenol    Date Last Reviewed:  2/6/2018   Number of Personal Factors/Comorbidities that affect the Plan of Care: 1-2: MODERATE COMPLEXITY   EXAMINATION:   Observation/Orthostatic Postural Assessment:  Assessed 1/24/18 Clarke Herzog sits with forward head and rounded shoulders which indicate tight anterior chest musculature, upper trapezius, and levator scapula and weak posterior scapula musculature and deep cervical flexors. She holds her foot in dorsiflexed and inverted position - decreased guarded positioning.  She does not touch foot to floor when sitting  Palpation:  Assessed 1/2/18         Clarke Herzog notes tenderness with palpation right plantar fascia, decreased myofascial mobility right foot/ankle and surrounding incision, increased edema right ankle/foot  ROM:  Assessed 1/24/18  Left ankle dorsiflexion active range of motion with knee flexed: 10 degrees  Left ankle dorsiflexion active range of motion with knee extended: 10 degrees  Left ankle dorsiflexion passive range of motion with knee extended: 20 degrees  Left ankle plantarflexion active range of motion: 35 degrees    Right ankle dorsiflexion active range of motion with knee flexed: 10 degrees  Rightt ankle dorsiflexion active range of motion with knee extended: 10 degrees  Right ankle dorsiflexion passive range of motion with knee extended: 15 degrees  Rightt ankle plantarflexion active range of motion: 30 degrees  At least 30 degrees of ankle inversion/eversion passive range of motion    Right first ray dorsiflexed with hypomobility plantarflexion   Strength:  Assessed 1/24/18         At least 3/5 strength right ankle - will continue to assess as appropriate  4+/5 left ankle and lower extremity all major muscle groups  Special Tests:    Neurological Screen: Assessed 1/2/18        Myotomes:  Strong left lower extremity, weak right ankle/foot due to recent procedure        Dermatomes: Altered sensation right foot/ankle        Reflexes:    Functional Mobility: Assessed 1/24/18 Pecolia Hashimoto is modified independent with functional mobility and activities of daily living with use of scooter, she notes she has tried to shift weight to right lower extremity with use of rolling walker but has difficulty - still not attempting to ambulate with assistive device   Balance:  Assessed 1/2/17         Poor standing dynamic balance - fair standing dynamic balance with use of scooter  Mental Status:  Assessed 1/2/17        Alert and oriented x 4   Body Structures Involved:  1. Nerves  2. Bones  3. Joints  4. Muscles  5. Ligaments Body Functions Affected:  1. Sensory/Pain  2. Neuromusculoskeletal  3. Movement Related  4. Skin Related Activities and Participation Affected:  1. General Tasks and Demands  2. Mobility  3. Self Care  4. Domestic Life  5. Interpersonal Interactions and Relationships  6.  Community, Social and [place] Number of elements that affect the Plan of Care: 3: MODERATE COMPLEXITY   CLINICAL PRESENTATION:   Presentation: Evolving clinical presentation with changing clinical characteristics: MODERATE COMPLEXITY   CLINICAL DECISION MAKING:   Outcome Measure: Assessed 1/24/18  Tool Used: PT/OT FOOT AND ANKLE ABILITY MEASURE  Score:  Initial: 1/84 3/84   Interpretation of Score: For the \"Activities of Daily Living\", there are 21 questions each scored on a 5 point scale with 0 representing \"Unable to do\" and 4 representing \"No difficulty\". The lower the score, the greater the functional disability. 84/84 represents no disability. Minimal detectable change is 5.7 points. With the addition of the 8 questions in the \"Sports Subscale,\" there are 29 questions, each scored on a 5 point scale with 0 representing \"Unable to do\" and 4 representing \"No difficulty\". The lower the score, the greater the functional disability. 116/116 represents no disability. Minimal detectable change is 12.3 points. Activities of Daily Living:  Score 84 83-68 67-51 50-34 33-18 17-1 0   Modifier CH CI CJ CK CL CM CN     Medical Necessity:   · Patient is expected to demonstrate progress in strength, range of motion, balance, coordination and functional technique to increase independence with pain free functional mobility. · Patient demonstrates good rehab potential due to higher previous functional level. Reason for Services/Other Comments:  · Patient continues to require skilled intervention due to decreased independence with functional mobility. Use of outcome tool(s) and clinical judgement create a POC that gives a: Questionable prediction of patient's progress: MODERATE COMPLEXITY            TREATMENT:   (In addition to Assessment/Re-Assessment sessions the following treatments were rendered)  Pre-treatment Symptoms/Complaints:  [facility] notes pain to be 5-6/10. She still has huge bruise on R thigh. Coloring is as it is healing. Pain: Initial:     5-6/10     Aquatic Therapy ( 50 minutes): Aquatic treatment performed per flow grid for Decreased muscle strength, Decreased endurance and Decreased activity endurance. Cues provided for alignment and weight bearing. .  Assistance by therapist provided for patient to be able to weight bear on R LE.     Aquatic Exercise Log       Date  1-16-18 Date  1-18-18 Date  1-23-18 1-25-18 1-30-18 2-1-18 2-6-18   Activity/ Exercise Parameters Parameters Parameters       Walking forward Seated on noodle - 4 laps Seated on noodle - 4 laps Seated on noodle - 4 laps and 6 laps with rail and HHA in 3.5 ft Seated on noodle - 4 laps   Walking with rail and HHA - 6 laps Seated on noodle - 4 laps   Walking 8 laps with rail and HHA Seated on noodle - 4 laps   Walking - 6 laps with rail and HHA - emphasis on putting weight through foot. Seated on noodle - 4 laps   Standing with rail and HHA x 6 with emphasis on weight bearing.     Walking backward Seated on noodle - 4 laps Seated on noodle - 4 laps Seated on noodle - 4 laps and 2 laps with rail and HHA  In 3.5 ft Seated on noodle - 4 laps   Walking with rail and HHA - 2 laps  Seated on noodle - 4 laps  Seated on noodle - 4 laps  Seated on noodle - 4 laps   Walking sideways Seated on noodle - wide knees and close knees- 2 laps each  Seated on noodle - wide leg step and close knees - both 2 laps each  Seated on noodle - wide and narrow knees - 2 laps each then 4 laps with rail in 3.5 ft Walking in shallow and deeper - 4 laps  With rail Seated on noodle - 2 positions - 4 laps each Seated on noodle - 2 laps in wide steps  2 laps in narrow steps       Marching Standing in 4.5 feet  Tapping R foot to big step and back 2 x 10  Standing in 4.5 feet - tapping R foot fat on step and back to bottom of pool - keeping foot flat In place x 20  In place tapping foot x 10 B         Goose Step                Tip toes                Heels                Lunges             Side step squats             LE Exercises noodle noodle noodle    Noodle       Hip Flex/Ext Marching x 15 R Marching x 15 R Marching x 15 R    Marching x 15 R  X 5 L      Hip Abd/Add X 15 R X 15 R X 15 R    X 15 R  X 5 L      Hip IR/ER                Leg press       Against wall - both feet x 10   With R only pushing through heel - x 10   Against wall   X 10 B   X 5 R       Knee Flex     X 15 B  Knee flexed - R quad stretch with noodle  Knee flexed - R quad stretch        Squats     X 10           Leg Circles Deeper water x 10 each way each leg Deeper water x 10 each way each leg Deeper water x 10 each way each leg Deeper water x 10 each way each leg Deeper water x 10 each way each leg Deeper water x 10 each way each leg Deep water x 10 each way each leg      Step Ups     Red step 2 x 10 B  Big step x 10  Big step   X 10 B Big step ups x 10  First step coming out x 10 leading with R with therapist foot under hers to encourage weight bearing Bog step ups x 10 B   UE Exercises                Squeeze In                Push Down                Pull Down                Bicep/Tricep             Rows/Press outs              Chi Positions                Trunk Rotation             Deep H2O/ Noodles Noodle and rings Noodle and rings Noodle and or rings Noodle and rings Noodle and rings Noodle and rings rings      Stabilization   Core stability Core stability           Arms only     Straddle - 2 laps Straddle - 2 laps Straddle - 2 laps  Straddle -  2 laps       Legs only Arms and legs - straddle - 6 laps Just legs- 2 laps  Both - 4 laps Straddle - 2 laps  Both - 2 laps Straddle - 2 laps   Both - 4 laps Straddle - 4 laps   Both - 2 laps  Straddle - 4 laps   Both - 2 laps  - 4 laps  Both - 2 laps   [employer] 2 minutes 2 minutes 2 min 2.5 min 2.5 min  3 min      Scissors 2 minutes  Combo - 2 minutes 2 minutes   Combo   2 minutes 2 min  Combo x 2 min 2.5 min  Combo   2.5 min 2.5 min  3 min  Combo - 3 min        Crab walk 4 laps 4 laps 4 laps 4 laps  4 laps  4 laps    Lower abdominal   work  Knees to chest x 10    Knees to chest x 10   Knees to chest   2 x 10  SLS activities with lots of encouragement to do task.        Cardio         Weight shifting to SLS to each side x 10        Jogging     2 min in deeper water  In deep - 3 min  In deep - jog and punch - 3 minutes   Lap   Swimming   Frog kick - 4 lengths of pool   Flutter kick - 4 lengths of pool             Stretches On bench  On bench   on bench  On bench  On bench  At wall  On bench      Hamstrings X 3 B X 3 B X 3 B X 3 B X 3 B X 3 B X 3 B      Heelcords X 3 B X 3 B  And on step R X 3 B X 3 B X 3 B X 3 B X 3 B      Piriformis X 3 B X 3 B X 3 B X 3 B X 3 B     R foot  By therapist   By therapist By therapist By therapist  By therapist                           Treatment/Session Assessment:  Patient was encouraged throughout session to put more weight on foot. She actually exited pool via steps with rail and assist of therapist. This required a good bit of encouragement, but she did it. She reports being scared. Today her R foot was very tight, and after talking with her realized that she had not stretched her foot all weekend. Did discuss with her during stretching that she sees why she needs to try and stretch every day. Response to Treatment:  Marbella Barahona notes 6/10 pain following therapy session. Pain: Post Treatment Session: 6/10  · Compliance with Program/Exercises: Will assess as treatment progresses. · Recommendations/Intent for next treatment session: \"Next visit will focus on advancements to more challenging activities and aquatic therapy for improved weight bearing right lower extremity\".   Total Treatment Duration:    PT Patient Time In/Time Out  Time In: 1015  Time Out: [place], [place]

## 2018-02-07 ENCOUNTER — HOSPITAL ENCOUNTER (OUTPATIENT)
Dept: PHYSICAL THERAPY | Age: 33
Discharge: HOME | End: 2018-02-07
Payer: COMMERCIAL

## 2018-02-07 PROCEDURE — 97110 THERAPEUTIC EXERCISES: CPT

## 2018-02-07 PROCEDURE — 97140 MANUAL THERAPY 1/> REGIONS: CPT

## 2018-02-07 NOTE — PROGRESS NOTES
Coby Sell  : 1985 Coby Sell  : 1985  Payor: Harvey Myers / Plan: [place] [facility]UE CROSS OF [facility] / Product Type: PPO /  [address]  at [facility]  [address], [address], [place] [address] [address] [facility]  [phone]   [phone]              OUTPATIENT PHYSICAL THERAPY: Daily Note 2018    ICD-10: Treatment Diagnosis: pain in right foot (M79.671)  Difficulty in walking, not elsewhere classified (R26.2)  Precautions/Allergies:   None per [employer]   Fall Risk Score: 1 (? 5 = High Risk)  MD Orders: Evaluate and treat MEDICAL/REFERRING DIAGNOSIS:  Pain, foot, right, chronic [M79.671, G89.29]  DATE OF ONSET: date of surgery 17  REFERRING PHYSICIAN: Jonny Lazo MD; Haley Valencia MD  RETURN PHYSICIAN APPOINTMENT: none schedule at this time   Patient has attended 14 physical therapy session including initial evaluation. INITIAL ASSESSMENT:  Ms. Trista Jung presents with improving pain right foot and continued difficulty walking status post tarsal tunnel decompression on 17 with the [facility]. She had an ankle injury in 2017 that left her unable to bear weight through her right lower extremity. [facility] gave her a preliminary diagnosis of CRPS but then performed tarsal tunnel decompression right. She is cleared for weight bearing as tolerated status post procedure. She currently uses a knee scooter to get around. She holds her foot in dorsiflexed and inverted position - but positioning is decreasing at rest. She does not like to touch her foot to floor. She presents with improving range of motion right foot and ankle. She presents with decreased independence with functional mobility and activities of daily living. She would benefit from skilled physical therapy in order to restore prior level of function and prevent future impairment. PROBLEM LIST (Impacting functional limitations):  1. Decreased Strength  2.  Decreased ADL/Functional Activities  3. Decreased Transfer Abilities  4. Decreased Ambulation Ability/Technique  5. Decreased Balance  6. Increased Pain  7. Decreased Activity Tolerance  8. Decreased Pacing Skills  9. Decreased Work Simplification/Energy Conservation Techniques  10. Decreased Flexibility/Joint Mobility  11. Edema/Girth  12. Decreased [place] with Home Exercise Program INTERVENTIONS PLANNED:  1. Balance Exercise  2. Cold  3. Family Education  4. Gait Training  5. Heat  6. Home Exercise Program (HEP)  7. Manual Therapy  8. Neuromuscular Re-education/Strengthening  9. Range of Motion (ROM)  10. Therapeutic Activites  11. Therapeutic Exercise/Strengthening  12. Transcutaneous Electrical Nerve Stimulation (TENS)  13. Transfer Training  14. Ultrasound (US)  15. orthotic management and training   TREATMENT PLAN:  Effective Dates: 1/2/18 TO 4/2/18. Frequency/Duration: 2 times a week for 8 weeks  GOALS: (Goals have been discussed and agreed upon with patient.)  Discharge Goals: Time Frame: 8 weeks  1. Patient will be independent with home exercise program within 8 weeks in order to improve independence with management of patient's symptoms and/or functional deficits. (CONTINUE 1/24/18)  2. Patient will improve their foot and ankle ability measure score to 20/84 within 8 weeks in order to improve pain free independence with functional mobility. (CONTINUE 1/24/18)  3. Patient will ambulate with equal weight bearing with use of least restrictive device within 8 weeks in order to improve pain free independence with functional mobility. (CONTINUE 1/24/18)  Rehabilitation Potential For Stated Goals: Good              The information in this section was collected on 1/2/18 (except where otherwise noted). HISTORY:   History of Present Injury/Illness (Reason for Referral):  Ms. Maricarmen Loomis presents with increased pain right foot and difficulty walking status [facility]  She had an ankle injury in April 2017 that left her unable to bear weight through her right lower extremity. [address] gave her a preliminary diagnosis of CRPS but then performed tarsal tunnel decompression right. She is cleared for weight bearing as tolerated status post procedure. She currently uses a knee scooter to get around. She holds her foot in dorsiflexed and inverted position. She does not like to touch her foot to floor. 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 Current Medications: vuvanse sonata, birth control, advil, tylenol    Date Last Reviewed:  2/8/2018   Number of Personal Factors/Comorbidities that affect the Plan of Care: 1-2: MODERATE COMPLEXITY   EXAMINATION:   Observation/Orthostatic Postural Assessment:  Assessed 1/24/18 Edmond Peres sits with forward head and rounded shoulders which indicate tight anterior chest musculature, upper trapezius, and levator scapula and weak posterior scapula musculature and deep cervical flexors. She holds her foot in dorsiflexed and inverted position - decreased guarded positioning.  She does not touch foot to floor when sitting  Palpation:  Assessed 1/2/18         Edmond Peres notes tenderness with palpation right plantar fascia, decreased myofascial mobility right foot/ankle and surrounding incision, increased edema right ankle/foot  ROM:  Assessed 1/24/18  Left ankle dorsiflexion active range of motion with knee flexed: 10 degrees  Left ankle dorsiflexion active range of motion with knee extended: 10 degrees  Left ankle dorsiflexion passive range of motion with knee extended: 20 degrees  Left ankle plantarflexion active range of motion: 35 degrees    Right ankle dorsiflexion active range of motion with knee flexed: 10 degrees  Rightt ankle dorsiflexion active range of motion with knee extended: 10 degrees  Right ankle dorsiflexion passive range of motion with knee extended: 15 degrees  Rightt ankle plantarflexion active range of motion: 30 degrees  At least 30 degrees of ankle inversion/eversion passive range of motion    Right first ray dorsiflexed with hypomobility plantarflexion   Strength:  Assessed 1/24/18         At least 3/5 strength right ankle - will continue to assess as appropriate  4+/5 left ankle and lower extremity all major muscle groups  Special Tests:    Neurological Screen: Assessed 1/2/18        Myotomes:  Strong left lower extremity, weak right ankle/foot due to recent procedure        Dermatomes: Altered sensation right foot/ankle        Reflexes:    Functional Mobility: Assessed 1/24/18 Cuate Vargas is modified independent with functional mobility and activities of daily living with use of scooter, she notes she has tried to shift weight to right lower extremity with use of rolling walker but has difficulty - still not attempting to ambulate with assistive device   Balance:  Assessed 1/2/17         Poor standing dynamic balance - fair standing dynamic balance with use of scooter  Mental Status:  Assessed 1/2/17        Alert and oriented x 4   Body Structures Involved:  1. Nerves  2. Bones  3. Joints  4. Muscles  5. Ligaments Body Functions Affected:  1. Sensory/Pain  2. Neuromusculoskeletal  3. Movement Related  4. Skin Related Activities and Participation Affected:  1. General Tasks and Demands  2. Mobility  3. Self Care  4. Domestic Life  5. Interpersonal Interactions and Relationships  6.  Community, Social and [place]   Number of elements that affect the Plan of Care: 3: MODERATE COMPLEXITY   CLINICAL PRESENTATION:   Presentation: Evolving clinical presentation with changing clinical characteristics: MODERATE COMPLEXITY   CLINICAL DECISION MAKING:   Outcome Measure: Assessed 1/24/18  Tool Used: PT/OT FOOT AND ANKLE ABILITY MEASURE  Score:  Initial: 1/84 3/84   Interpretation of Score: For the \"Activities of Daily Living\", there are 21 questions each scored on a 5 point scale with 0 representing \"Unable to do\" and 4 representing \"No difficulty\". The lower the score, the greater the functional disability. 84/84 represents no disability. Minimal detectable change is 5.7 points. With the addition of the 8 questions in the \"Sports Subscale,\" there are 29 questions, each scored on a 5 point scale with 0 representing \"Unable to do\" and 4 representing \"No difficulty\". The lower the score, the greater the functional disability. 116/116 represents no disability. Minimal detectable change is 12.3 points. Activities of Daily Living:  Score 84 83-68 67-51 50-34 33-18 17-1 0   Modifier CH CI CJ CK CL CM CN     Medical Necessity:   · Patient is expected to demonstrate progress in strength, range of motion, balance, coordination and functional technique to increase independence with pain free functional mobility. · Patient demonstrates good rehab potential due to higher previous functional level. Reason for Services/Other Comments:  · Patient continues to require skilled intervention due to decreased independence with functional mobility.    Use of outcome tool(s) and clinical judgement create a POC that gives a: Questionable prediction of patient's progress: MODERATE COMPLEXITY            TREATMENT:   (In addition to Assessment/Re-Assessment sessions the following treatments were rendered)  Pre-treatment Symptoms/Complaints:  Rivka Pittman notes 6/10 pain right foot an ankle - she notes increased pain following aquatic therapy session yesterday  Pain: Initial:   Pain Intensity 1: 6  Pain Location 1: Foot  Pain Orientation 1: Right  Pain Intervention(s) 1: Exercise, Therapeutic touch, Cold pack      (In addition to Assessment/Re-Assessment sessions the following treatments were rendered)  Therapeutic Exercise: ( 13 minutes):  Exercises per grid below to improve mobility, strength and coordination. Required minimal visual and verbal cues to promote proper body alignment and promote proper body posture. Progressed range and complexity of movement as indicated. 1. Gentle active range of motion right for improved mobility and strength - ABC, dorsiflexion/plantarflexion, inversion/eversion, circles     2. Gentle passive dorsiflexion stretching with towel with foot inverted to reduce tibial nerve tension 3 sets 30 seconds right    3. Tibial nerve gliding technique with foot plantarflexed and inverted and short arc quads 20 repetitions right     4. Gentle weight bearing dorsiflexion stretch in sitting with incline board and foot inverted 3 sets 30 seconds right Not performed this date     5. NuStep for close chain dynamic mobility right ankle/foot level 1 8 minutes Not performed this date     6. Ankle strengthening with red theraband resistance - dorsiflexion, plantarflexion, eversion, inversion 20 repetitions     7. Standing in parallel bars with attempting to put weight through right lower extremity 2 minutes Not performed this date     8. Ambulation with rolling walker with cues for equal step lengths 2 sets 100 feet - she placed foot on floor but no weightbearing right lower extremity     9. Sitting on jada disc with right foot on floor and left LAQ and marching and hamstring curls with red theraband resistance (bilaterally) for weightbearing through right lower extremity and dynamic stability right ankle        Manual Therapy (     32 minutes):    Myofascial release to right ankle/foot for improved mobility, muscle relaxation and decreased pain Cross friction massage to right ankle incision to prevent adhesions   First ray plantarflexion/dorsiflexion mobilization for improved mobility   Plantar fascia cross friction massage for improved mobility     Therapeutic Modalities: for decreased pain and inflammation   Cold pack application to right foot/ankle in supine for 10 minutes                                                                                                Treatment/Session Assessment:    Response to Treatment:  Verna Haynes notes 6/10 pain following therapy session. Pain: Post Treatment Session: 6/10  · Compliance with Program/Exercises: Will assess as treatment progresses. · Recommendations/Intent for next treatment session: \"Next visit will focus on advancements to more challenging activities and aquatic therapy for improved weight bearing right lower extremity\".   Total Treatment Duration:  PT Patient Time In/Time Out  Time In: 1651  Time Out: Elias Hastings PT

## 2018-02-08 ENCOUNTER — APPOINTMENT (OUTPATIENT)
Dept: PHYSICAL THERAPY | Age: 33
End: 2018-02-08
Payer: COMMERCIAL

## 2018-02-13 ENCOUNTER — HOSPITAL ENCOUNTER (OUTPATIENT)
Dept: PHYSICAL THERAPY | Age: 33
Discharge: HOME | End: 2018-02-13
Payer: COMMERCIAL

## 2018-02-13 PROCEDURE — 97110 THERAPEUTIC EXERCISES: CPT

## 2018-02-13 PROCEDURE — 97140 MANUAL THERAPY 1/> REGIONS: CPT

## 2018-02-14 ENCOUNTER — HOSPITAL ENCOUNTER (OUTPATIENT)
Dept: PHYSICAL THERAPY | Age: 33
Discharge: HOME | End: 2018-02-14
Payer: COMMERCIAL

## 2018-02-14 PROCEDURE — 97140 MANUAL THERAPY 1/> REGIONS: CPT

## 2018-02-14 PROCEDURE — 97110 THERAPEUTIC EXERCISES: CPT

## 2018-02-14 NOTE — PROGRESS NOTES
Jeremy Garcia  : 1985 Jeremy Garcia  : 1985  Payor: Elisabet Ice / Plan: [place] [employer]UE [place]OSS OF [address] / Product Type: PPO /  [facility]  [address] [facility], [place] [address] [address] [facility]  [phone]   [phone]              OUTPATIENT PHYSICAL THERAPY: Daily Note 2018    ICD-10: Treatment Diagnosis: pain in right foot (M79.671)  Difficulty in walking, not elsewhere classified (R26.2)  Precautions/Allergies:   None per Jeremy Garcia   Fall Risk Score: 1 (? 5 = High Risk)  MD Orders: Evaluate and treat MEDICAL/REFERRING DIAGNOSIS:  Pain, foot, right, chronic [M79.671, G89.29]  DATE OF ONSET: date of surgery 17  REFERRING PHYSICIAN: Owen Blair MD; Aimee Becerril MD  RETURN PHYSICIAN APPOINTMENT: none schedule at this time   Patient has attended 17 physical therapy session including initial evaluation. INITIAL ASSESSMENT:  Ms. Geoff Elena presents with improving pain right foot and continued difficulty walking status post tarsal tunnel decompression on 17 with the [facility]. She had an ankle injury in 2017 that left her unable to bear weight through her right lower extremity. [facility] gave her a preliminary diagnosis of CRPS but then performed tarsal tunnel decompression right. She is cleared for weight bearing as tolerated status post procedure. She currently uses a knee scooter to get around. She holds her foot in dorsiflexed and inverted position - but positioning is decreasing at rest. She does not like to touch her foot to floor. She presents with improving range of motion right foot and ankle. She presents with decreased independence with functional mobility and activities of daily living. She would benefit from skilled physical therapy in order to restore prior level of function and prevent future impairment. PROBLEM LIST (Impacting functional limitations):  1. Decreased Strength  2.  Decreased ADL/Functional Activities  3. Decreased Transfer Abilities  4. Decreased Ambulation Ability/Technique  5. Decreased Balance  6. Increased Pain  7. Decreased Activity Tolerance  8. Decreased Pacing Skills  9. Decreased Work Simplification/Energy Conservation Techniques  10. Decreased Flexibility/Joint Mobility  11. Edema/Girth  12. Decreased [place] with Home Exercise Program INTERVENTIONS PLANNED:  1. Balance Exercise  2. Cold  3. Family Education  4. Gait Training  5. Heat  6. Home Exercise Program (HEP)  7. Manual Therapy  8. Neuromuscular Re-education/Strengthening  9. Range of Motion (ROM)  10. Therapeutic Activites  11. Therapeutic Exercise/Strengthening  12. Transcutaneous Electrical Nerve Stimulation (TENS)  13. Transfer Training  14. Ultrasound (US)  15. orthotic management and training   TREATMENT PLAN:  Effective Dates: 1/2/18 TO 4/2/18. Frequency/Duration: 2 times a week for 8 weeks  GOALS: (Goals have been discussed and agreed upon with patient.)  Discharge Goals: Time Frame: 8 weeks  1. Patient will be independent with home exercise program within 8 weeks in order to improve independence with management of patient's symptoms and/or functional deficits. (CONTINUE 1/24/18)  2. Patient will improve their foot and ankle ability measure score to 20/84 within 8 weeks in order to improve pain free independence with functional mobility. (CONTINUE 1/24/18)  3. Patient will ambulate with equal weight bearing with use of least restrictive device within 8 weeks in order to improve pain free independence with functional mobility. (CONTINUE 1/24/18)  Rehabilitation Potential For Stated Goals: Good              The information in this section was collected on 1/2/18 (except where otherwise noted). HISTORY:   History of Present Injury/Illness (Reason for Referral):  Ms. Leilani Gray presents with increased pain right foot and difficulty walking status post tarsal tunnel decompression on 12/7/17 with the [facility].  She had an ankle injury in April 2017 that left her unable to bear weight through her right lower extremity. [address] gave her a preliminary diagnosis of CRPS but then performed tarsal tunnel decompression right. She is cleared for weight bearing as tolerated status post procedure. She currently uses a knee scooter to get around. She holds her foot in dorsiflexed and inverted position. She does not like to touch her foot to floor. 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 Current Medications: vuvanse sonata, birth control, advil, tylenol    Date Last Reviewed:  2/14/2018   Number of Personal Factors/Comorbidities that affect the Plan of Care: 1-2: MODERATE COMPLEXITY   EXAMINATION:   Observation/Orthostatic Postural Assessment:  Assessed 1/24/18 Cuate Vargas sits with forward head and rounded shoulders which indicate tight anterior chest musculature, upper trapezius, and levator scapula and weak posterior scapula musculature and deep cervical flexors. She holds her foot in dorsiflexed and inverted position - decreased guarded positioning.  She does not touch foot to floor when sitting  Palpation:  Assessed 1/2/18         Cuate Vargas notes tenderness with palpation right plantar fascia, decreased myofascial mobility right foot/ankle and surrounding incision, increased edema right ankle/foot  ROM:  Assessed 1/24/18  Left ankle dorsiflexion active range of motion with knee flexed: 10 degrees  Left ankle dorsiflexion active range of motion with knee extended: 10 degrees  Left ankle dorsiflexion passive range of motion with knee extended: 20 degrees  Left ankle plantarflexion active range of motion: 35 degrees    Right ankle dorsiflexion active range of motion with knee flexed: 10 degrees  Rightt ankle dorsiflexion active range of motion with knee extended: 10 degrees  Right ankle dorsiflexion passive range of motion with knee extended: 15 degrees  Rightt ankle plantarflexion active range of motion: 30 degrees  At least 30 degrees of ankle inversion/eversion passive range of motion    Right first ray dorsiflexed with hypomobility plantarflexion   Strength:  Assessed 1/24/18         At least 3/5 strength right ankle - will continue to assess as appropriate  4+/5 left ankle and lower extremity all major muscle groups  Special Tests:    Neurological Screen: Assessed 1/2/18        Myotomes:  Strong left lower extremity, weak right ankle/foot due to recent procedure        Dermatomes: Altered sensation right foot/ankle        Reflexes:    Functional Mobility: Assessed 1/24/18 Jeremy Garcia is modified independent with functional mobility and activities of daily living with use of scooter, she notes she has tried to shift weight to right lower extremity with use of rolling walker but has difficulty - still not attempting to ambulate with assistive device   Balance:  Assessed 1/2/17         Poor standing dynamic balance - fair standing dynamic balance with use of scooter  Mental Status:  Assessed 1/2/17        Alert and oriented x 4   Body Structures Involved:  1. Nerves  2. Bones  3. Joints  4. Muscles  5. Ligaments Body Functions Affected:  1. Sensory/Pain  2. Neuromusculoskeletal  3. Movement Related  4. Skin Related Activities and Participation Affected:  1. General Tasks and Demands  2. Mobility  3. Self Care  4. Domestic Life  5. Interpersonal Interactions and Relationships  6.  Community, Social and [place]   Number of elements that affect the Plan of Care: 3: MODERATE COMPLEXITY   CLINICAL PRESENTATION:   Presentation: Evolving clinical presentation with changing clinical characteristics: MODERATE COMPLEXITY   CLINICAL DECISION MAKING:   Outcome Measure: Assessed 1/24/18  Tool Used: PT/OT FOOT AND ANKLE ABILITY MEASURE  Score:  Initial: 1/84 3/84   Interpretation of Score: For the \"Activities of Daily Living\", there are 21 questions each scored on a 5 point scale with 0 representing \"Unable to do\" and 4 representing \"No difficulty\". The lower the score, the greater the functional disability. 84/84 represents no disability. Minimal detectable change is 5.7 points. With the addition of the 8 questions in the \"Sports Subscale,\" there are 29 questions, each scored on a 5 point scale with 0 representing \"Unable to do\" and 4 representing \"No difficulty\". The lower the score, the greater the functional disability. 116/116 represents no disability. Minimal detectable change is 12.3 points. Activities of Daily Living:  Score 84 83-68 67-51 50-34 33-18 17-1 0   Modifier CH CI CJ CK CL CM CN     Medical Necessity:   · Patient is expected to demonstrate progress in strength, range of motion, balance, coordination and functional technique to increase independence with pain free functional mobility. · Patient demonstrates good rehab potential due to higher previous functional level. Reason for Services/Other Comments:  · Patient continues to require skilled intervention due to decreased independence with functional mobility.    Use of outcome tool(s) and clinical judgement create a POC that gives a: Questionable prediction of patient's progress: MODERATE COMPLEXITY            TREATMENT:   (In addition to Assessment/Re-Assessment sessions the following treatments were rendered)  Pre-treatment Symptoms/Complaints:  Edmond Peres notes 6/10 pain right foot an ankle  Pain: Initial:   Pain Intensity 1: 6  Pain Location 1: Foot  Pain Orientation 1: Right  Pain Intervention(s) 1: Exercise, Therapeutic touch      (In addition to Assessment/Re-Assessment sessions the following treatments were rendered)  Therapeutic Exercise: ( 14):  Exercises per grid below to improve mobility, strength and coordination. Required minimal visual and verbal cues to promote proper body alignment and promote proper body posture. Progressed range and complexity of movement as indicated. 1. Gentle active range of motion right for improved mobility and strength - ABC, dorsiflexion/plantarflexion, inversion/eversion, circles Not performed this date     2. Gentle passive dorsiflexion stretching with towel with foot inverted to reduce tibial nerve tension 3 sets 30 seconds right    3. Tibial nerve gliding technique with foot plantarflexed and inverted and short arc quads 20 repetitions right Not performed this date     4. Gentle weight bearing dorsiflexion stretch in sitting with incline board and foot inverted 3 sets 30 seconds right Not performed this date     5. NuStep for close chain dynamic mobility right ankle/foot level 3 8 minutes - walking boot donned    6. Ankle strengthening with red theraband resistance - dorsiflexion, plantarflexion, eversion, inversion 20 repetitions Not performed this date     7. Standing in parallel bars with attempting to put weight through right lower extremity 2 minutes - on force plate with balance master this date and use of rolling walker and walking boot donned 20% weightbearing right lower extremity     8. Ambulation with rolling walker with cues for equal step lengths 2 sets 100 feet - dons walking boot right lower extremity     9. Sitting on jada disc with right foot on floor and left LAQ and marching and hamstring curls with red theraband resistance (bilaterally) for weightbearing through right lower extremity and dynamic stability right ankle Not performed this date        Manual Therapy (     25 minutes):    Myofascial release to right ankle/foot for improved mobility, muscle relaxation and decreased pain   Cross friction massage to right ankle incision to prevent adhesions   First ray plantarflexion/dorsiflexion mobilization for improved mobility   Plantar fascia cross friction massage for improved mobility   kinesiotape to plantar fascia and achilles tendon for improved mobility and decreased pain     Therapeutic Modalities:                                                                                                 Treatment/Session Assessment:    Response to Treatment:  Abiola Roldan notes 5/10 pain following therapy session. Able to demonstrate 20% weightbearing through right lower extremity with use of rolling walker and walking boot  Pain: Post Treatment Session: 6/10  · Compliance with Program/Exercises: Will assess as treatment progresses. · Recommendations/Intent for next treatment session: \"Next visit will focus on advancements to more challenging activities and aquatic therapy for improved weight bearing right lower extremity\".   Total Treatment Duration:  PT Patient Time In/Time Out  Time In: 0845  Time Out: 281 Sergo Pritchard, PT

## 2018-02-15 ENCOUNTER — HOSPITAL ENCOUNTER (OUTPATIENT)
Dept: PHYSICAL THERAPY | Age: 33
End: 2018-02-15
Payer: COMMERCIAL

## 2018-02-20 ENCOUNTER — APPOINTMENT (OUTPATIENT)
Dept: PHYSICAL THERAPY | Age: 33
End: 2018-02-20
Payer: COMMERCIAL

## 2018-02-21 ENCOUNTER — HOSPITAL ENCOUNTER (OUTPATIENT)
Dept: PHYSICAL THERAPY | Age: 33
Discharge: HOME | End: 2018-02-21
Payer: COMMERCIAL

## 2018-02-21 PROCEDURE — 97110 THERAPEUTIC EXERCISES: CPT

## 2018-02-21 PROCEDURE — 97140 MANUAL THERAPY 1/> REGIONS: CPT

## 2018-02-21 NOTE — PROGRESS NOTES
Suziel Alba  : 1985 Cherl Alba  : 1985  Payor: Marietta Michele / Plan: [facility] CROSS OF Kunal Foster  / Product Type: PPO /  Therapy Center at [facility]  [address], [address], [place] [address] [address] [facility]  [phone]   [phone]              OUTPATIENT PHYSICAL THERAPY: Daily Note 2018    ICD-10: Treatment Diagnosis: pain in right foot (M79.671)  Difficulty in walking, not elsewhere classified (R26.2)  Precautions/Allergies:   None per Enriqueta Dutton   Fall Risk Score: 1 (? 5 = High Risk)  MD Orders: Evaluate and treat MEDICAL/REFERRING DIAGNOSIS:  Pain, foot, right, chronic [M79.671, G89.29]  DATE OF ONSET: date of surgery 17  REFERRING PHYSICIAN: Christie Lara MD; Marcia Faustin MD  RETURN PHYSICIAN APPOINTMENT: none schedule at this time   Patient has attended 17 physical therapy session including initial evaluation. INITIAL ASSESSMENT:  Ms. Shon Wilkins presents with improving pain right foot and continued difficulty walking status post tarsal tunnel decompression on 17 with the [facility]. She had an ankle injury in 2017 that left her unable to bear weight through her right lower extremity. [facility] gave her a preliminary diagnosis of CRPS but then performed tarsal tunnel decompression right. She is cleared for weight bearing as tolerated status post procedure. She currently uses a knee scooter to get around. She holds her foot in dorsiflexed and inverted position - but positioning is decreasing at rest. She does not like to touch her foot to floor. She presents with improving range of motion right foot and ankle. She presents with decreased independence with functional mobility and activities of daily living. She would benefit from skilled physical therapy in order to restore prior level of function and prevent future impairment. PROBLEM LIST (Impacting functional limitations):  1. Decreased Strength  2.  Decreased ADL/Functional Activities  3. Decreased Transfer Abilities  4. Decreased Ambulation Ability/Technique  5. Decreased Balance  6. Increased Pain  7. Decreased Activity Tolerance  8. Decreased Pacing Skills  9. Decreased Work Simplification/Energy Conservation Techniques  10. Decreased Flexibility/Joint Mobility  11. Edema/Girth  12. Decreased Crane with Home Exercise Program INTERVENTIONS PLANNED:  1. Balance Exercise  2. Cold  3. Family Education  4. Gait Training  5. Heat  6. Home Exercise Program (HEP)  7. Manual Therapy  8. Neuromuscular Re-education/Strengthening  9. Range of Motion (ROM)  10. Therapeutic Activites  11. Therapeutic Exercise/Strengthening  12. Transcutaneous Electrical Nerve Stimulation (TENS)  13. Transfer Training  14. Ultrasound (US)  15. orthotic management and training   TREATMENT PLAN:  Effective Dates: 1/2/18 TO 4/2/18. Frequency/Duration: 2 times a week for 8 weeks  GOALS: (Goals have been discussed and agreed upon with patient.)  Discharge Goals: Time Frame: 8 weeks  1. Patient will be independent with home exercise program within 8 weeks in order to improve independence with management of patient's symptoms and/or functional deficits. (CONTINUE 1/24/18)  2. Patient will improve their foot and ankle ability measure score to 20/84 within 8 weeks in order to improve pain free independence with functional mobility. (CONTINUE 1/24/18)  3. Patient will ambulate with equal weight bearing with use of least restrictive device within 8 weeks in order to improve pain free independence with functional mobility. (CONTINUE 1/24/18)  Rehabilitation Potential For Stated Goals: Good              The information in this section was collected on 1/2/18 (except where otherwise noted). HISTORY:   History of Present Injury/Illness (Reason for Referral):  Ms. Richard Willis presents with increased pain right foot and difficulty walking status post tarsal tunnel decompression on 12/7/17 with the [facility].  She had an ankle injury in April 2017 that left her unable to bear weight through her right lower extremity. [facility] gave her a preliminary diagnosis of CRPS but then performed tarsal tunnel decompression right. She is cleared for weight bearing as tolerated status post procedure. She currently uses a knee scooter to get around. She holds her foot in dorsiflexed and inverted position. She does not like to touch her foot to floor. 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 Current Medications: vuvanse sonata, birth control, advil, tylenol    Date Last Reviewed:  2/21/2018   Number of Personal Factors/Comorbidities that affect the Plan of Care: 1-2: MODERATE COMPLEXITY   EXAMINATION:   Observation/Orthostatic Postural Assessment:  Assessed 1/24/18 Anna Byrne sits with forward head and rounded shoulders which indicate tight anterior chest musculature, upper trapezius, and levator scapula and weak posterior scapula musculature and deep cervical flexors. She holds her foot in dorsiflexed and inverted position - decreased guarded positioning.  She does not touch foot to floor when sitting  Palpation:  Assessed 1/2/18         Anna Byren notes tenderness with palpation right plantar fascia, decreased myofascial mobility right foot/ankle and surrounding incision, increased edema right ankle/foot  ROM:  Assessed 1/24/18  Left ankle dorsiflexion active range of motion with knee flexed: 10 degrees  Left ankle dorsiflexion active range of motion with knee extended: 10 degrees  Left ankle dorsiflexion passive range of motion with knee extended: 20 degrees  Left ankle plantarflexion active range of motion: 35 degrees    Right ankle dorsiflexion active range of motion with knee flexed: 10 degrees  Rightt ankle dorsiflexion active range of motion with knee extended: 10 degrees  Right ankle dorsiflexion passive range of motion with knee extended: 15 degrees  Rightt ankle plantarflexion active range of motion: 30 degrees  At least 30 degrees of ankle inversion/eversion passive range of motion    Right first ray dorsiflexed with hypomobility plantarflexion   Strength:  Assessed 1/24/18         At least 3/5 strength right ankle - will continue to assess as appropriate  4+/5 left ankle and lower extremity all major muscle groups  Special Tests:    Neurological Screen: Assessed 1/2/18        Myotomes:  Strong left lower extremity, weak right ankle/foot due to recent procedure        Dermatomes: Altered sensation right foot/ankle        Reflexes:    Functional Mobility: Assessed 1/24/18 Abiola Roldan is modified independent with functional mobility and activities of daily living with use of scooter, she notes she has tried to shift weight to right lower extremity with use of rolling walker but has difficulty - still not attempting to ambulate with assistive device   Balance:  Assessed 1/2/17         Poor standing dynamic balance - fair standing dynamic balance with use of scooter  Mental Status:  Assessed 1/2/17        Alert and oriented x 4   Body Structures Involved:  1. Nerves  2. Bones  3. Joints  4. Muscles  5. Ligaments Body Functions Affected:  1. Sensory/Pain  2. Neuromusculoskeletal  3. Movement Related  4. Skin Related Activities and Participation Affected:  1. General Tasks and Demands  2. Mobility  3. Self Care  4. Domestic Life  5. Interpersonal Interactions and Relationships  6.  Community, Social and [place]   Number of elements that affect the Plan of Care: 3: MODERATE COMPLEXITY   CLINICAL PRESENTATION:   Presentation: Evolving clinical presentation with changing clinical characteristics: MODERATE COMPLEXITY   CLINICAL DECISION MAKING:   Outcome Measure: Assessed 1/24/18  Tool Used: PT/OT FOOT AND ANKLE ABILITY MEASURE  Score:  Initial: 1/84 3/84   Interpretation of Score: For the \"Activities of Daily Living\", there are 21 questions each scored on a 5 point scale with 0 representing \"Unable to do\" and 4 representing \"No difficulty\". The lower the score, the greater the functional disability. 84/84 represents no disability. Minimal detectable change is 5.7 points. With the addition of the 8 questions in the \"Sports Subscale,\" there are 29 questions, each scored on a 5 point scale with 0 representing \"Unable to do\" and 4 representing \"No difficulty\". The lower the score, the greater the functional disability. 116/116 represents no disability. Minimal detectable change is 12.3 points. Activities of Daily Living:  Score 84 83-68 67-51 50-34 33-18 17-1 0   Modifier CH CI CJ CK CL CM CN     Medical Necessity:   · Patient is expected to demonstrate progress in strength, range of motion, balance, coordination and functional technique to increase independence with pain free functional mobility. · Patient demonstrates good rehab potential due to higher previous functional level. Reason for Services/Other Comments:  · Patient continues to require skilled intervention due to decreased independence with functional mobility.    Use of outcome tool(s) and clinical judgement create a POC that gives a: Questionable prediction of patient's progress: MODERATE COMPLEXITY            TREATMENT:   (In addition to Assessment/Re-Assessment sessions the following treatments were rendered)  Pre-treatment Symptoms/Complaints:  Robles Arizmendi notes 6/10 pain right foot an ankle  Pain: Initial:   Pain Intensity 1: 6  Pain Location 1: Foot  Pain Orientation 1: Right  Pain Intervention(s) 1: Exercise, Therapeutic touch      (In addition to Assessment/Re-Assessment sessions the following treatments were rendered)  Therapeutic Exercise: ( 29  minutes):  Exercises per grid below to improve mobility, strength and coordination. Required minimal visual and verbal cues to promote proper body alignment and promote proper body posture. Progressed range and complexity of movement as indicated. 1. Gentle active range of motion right for improved mobility and strength - ABC, dorsiflexion/plantarflexion, inversion/eversion, circles Not performed this date     2. Gentle passive dorsiflexion stretching with towel with foot inverted to reduce tibial nerve tension 3 sets 30 seconds right    3. Tibial nerve gliding technique with foot plantarflexed and inverted and short arc quads 20 repetitions right Not performed this date     4. Gentle weight bearing dorsiflexion stretch in sitting with incline board and foot inverted 3 sets 30 seconds right Not performed this date     5. NuStep for close chain dynamic mobility right ankle/foot level 3 10 minutes - walking boot donned    6. Ankle strengthening with red theraband resistance - dorsiflexion, plantarflexion, eversion, inversion 20 repetitions Not performed this date     7. Standing in parallel bars with attempting to put weight through right lower extremity 2 minutes - on force plate with balance master this date and use of rolling walker and towel roll under right heel 23% weightbearing right lower extremity     8. Ambulation with rolling walker with cues for equal step lengths 2 sets 100 feet - dons walking boot right lower extremity     9. Sitting on jada disc with right foot on floor and left LAQ and marching and hamstring curls with red theraband resistance (bilaterally) for weightbearing through right lower extremity and dynamic stability right ankle Not performed this date     10.  Sitting heel/toe raises and inversion for improved strength and mobility 20 repetitions each bilaterally     11. Standing with right foot on floor with towel under heel and use of rolling walker with quickly lifting left lower extremity off floor 5 repetitions for improved weight bearing over right lower extremity - significant weight through arms this date     Manual Therapy (     13 minutes): Myofascial release to right ankle/foot for improved mobility, muscle relaxation and decreased pain   Cross friction massage to right ankle incision to prevent adhesions   First ray plantarflexion/dorsiflexion mobilization for improved mobility   Plantar fascia cross friction massage for improved mobility   kinesiotape to plantar fascia and achilles tendon for improved mobility and decreased pain   leukotape over proximal plantar fascia at heel  For decreased tension, improved mobility and decreased pain     Therapeutic Modalities:                                                                                                 Treatment/Session Assessment:    Response to Treatment:  Mark Derik notes 5/10 pain following therapy session. Able to demonstrate 23% weightbearing through right lower extremity with use of rolling walker and towel roll under right heel, significant improvement in dorsiflexion range of motion to 20 degrees with knee extended and flexed   Pain: Post Treatment Session: 5/10  · Compliance with Program/Exercises: Will assess as treatment progresses. · Recommendations/Intent for next treatment session: \"Next visit will focus on advancements to more challenging activities and aquatic therapy for improved weight bearing right lower extremity\".   Total Treatment Duration:  PT Patient Time In/Time Out  Time In: 1300  Time Out: JOSAFAT Mary

## 2018-02-22 ENCOUNTER — HOSPITAL ENCOUNTER (OUTPATIENT)
Dept: PHYSICAL THERAPY | Age: 33
Discharge: HOME | End: 2018-02-22
Payer: COMMERCIAL

## 2018-02-22 PROCEDURE — 97110 THERAPEUTIC EXERCISES: CPT

## 2018-02-22 NOTE — PROGRESS NOTES
Caprice Formica  : 1985 Caprice Formica  : 1985  Payor: Patricia Lindo / Plan: [facility] CROSS OF [facility] / Product Type: PPO /  [facility]  [address] [facility], [place] [address] [address] [facility]  [phone]   [phone]              OUTPATIENT PHYSICAL THERAPY: Daily Note 2018    ICD-10: Treatment Diagnosis: pain in right foot (M79.671)  Difficulty in walking, not elsewhere classified (R26.2)  Precautions/Allergies:   None per Caprice Formica   Fall Risk Score: 1 (? 5 = High Risk)  MD Orders: Evaluate and treat MEDICAL/REFERRING DIAGNOSIS:  Pain, foot, right, chronic [M79.671, G89.29]  DATE OF ONSET: date of surgery 17  REFERRING PHYSICIAN: Fara Pickens MD; Sherry Valdez MD  RETURN PHYSICIAN APPOINTMENT: none schedule at this time   Patient has attended 18 physical therapy session including initial evaluation. INITIAL ASSESSMENT:  Ms. Ilana Roldan presents with improving pain right foot and continued difficulty walking status post tarsal tunnel decompression on 17 with the [facility]. She had an ankle injury in 2017 that left her unable to bear weight through her right lower extremity. [facility] gave her a preliminary diagnosis of CRPS but then performed tarsal tunnel decompression right. She is cleared for weight bearing as tolerated status post procedure. She currently uses a knee scooter to get around. She holds her foot in dorsiflexed and inverted position - but positioning is decreasing at rest. She does not like to touch her foot to floor. She presents with improving range of motion right foot and ankle. She presents with decreased independence with functional mobility and activities of daily living. She would benefit from skilled physical therapy in order to restore prior level of function and prevent future impairment. PROBLEM LIST (Impacting functional limitations):  1. Decreased Strength  2.  Decreased ADL/Functional Activities  3. Decreased Transfer Abilities  4. Decreased Ambulation Ability/Technique  5. Decreased Balance  6. Increased Pain  7. Decreased Activity Tolerance  8. Decreased Pacing Skills  9. Decreased Work Simplification/Energy Conservation Techniques  10. Decreased Flexibility/Joint Mobility  11. Edema/Girth  12. Decreased [place] with Home Exercise Program INTERVENTIONS PLANNED:  1. Balance Exercise  2. Cold  3. Family Education  4. Gait Training  5. Heat  6. Home Exercise Program (HEP)  7. Manual Therapy  8. Neuromuscular Re-education/Strengthening  9. Range of Motion (ROM)  10. Therapeutic Activites  11. Therapeutic Exercise/Strengthening  12. Transcutaneous Electrical Nerve Stimulation (TENS)  13. Transfer Training  14. Ultrasound (US)  15. orthotic management and training   TREATMENT PLAN:  Effective Dates: 1/2/18 TO 4/2/18. Frequency/Duration: 2 times a week for 8 weeks  GOALS: (Goals have been discussed and agreed upon with patient.)  Discharge Goals: Time Frame: 8 weeks  1. Patient will be independent with home exercise program within 8 weeks in order to improve independence with management of patient's symptoms and/or functional deficits. (CONTINUE 1/24/18)  2. Patient will improve their foot and ankle ability measure score to 20/84 within 8 weeks in order to improve pain free independence with functional mobility. (CONTINUE 1/24/18)  3. Patient will ambulate with equal weight bearing with use of least restrictive device within 8 weeks in order to improve pain free independence with functional mobility. (CONTINUE 1/24/18)  Rehabilitation Potential For Stated Goals: Good              The information in this section was collected on 1/2/18 (except where otherwise noted). HISTORY:   History of Present Injury/Illness (Reason for Referral):  Ms. Ricky Marquez presents with increased pain right foot and difficulty walking status post tarsal tunnel decompression on 12/7/17 with the [facility].  She had an ankle injury in April 2017 that left her unable to bear weight through her right lower extremity. [facility] gave her a preliminary diagnosis of CRPS but then performed tarsal tunnel decompression right. She is cleared for weight bearing as tolerated status post procedure. She currently uses a knee scooter to get around. She holds her foot in dorsiflexed and inverted position. She does not like to touch her foot to floor. 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 Current Medications: vuvanse sonata, birth control, advil, tylenol    Date Last Reviewed:  2/23/2018   Number of Personal Factors/Comorbidities that affect the Plan of Care: 1-2: MODERATE COMPLEXITY   EXAMINATION:   Observation/Orthostatic Postural Assessment:  Assessed 1/24/18 Dmitry Lui sits with forward head and rounded shoulders which indicate tight anterior chest musculature, upper trapezius, and levator scapula and weak posterior scapula musculature and deep cervical flexors. She holds her foot in dorsiflexed and inverted position - decreased guarded positioning.  She does not touch foot to floor when sitting  Palpation:  Assessed 1/2/18         Dmitry Liu notes tenderness with palpation right plantar fascia, decreased myofascial mobility right foot/ankle and surrounding incision, increased edema right ankle/foot  ROM:  Assessed 1/24/18  Left ankle dorsiflexion active range of motion with knee flexed: 10 degrees  Left ankle dorsiflexion active range of motion with knee extended: 10 degrees  Left ankle dorsiflexion passive range of motion with knee extended: 20 degrees  Left ankle plantarflexion active range of motion: 35 degrees    Right ankle dorsiflexion active range of motion with knee flexed: 10 degrees  Rightt ankle dorsiflexion active range of motion with knee extended: 10 degrees  Right ankle dorsiflexion passive range of motion with knee extended: 15 degrees  Rightt ankle plantarflexion active range of motion: 30 degrees  At least 30 degrees of ankle inversion/eversion passive range of motion    Right first ray dorsiflexed with hypomobility plantarflexion   Strength:  Assessed 1/24/18         At least 3/5 strength right ankle - will continue to assess as appropriate  4+/5 left ankle and lower extremity all major muscle groups  Special Tests:    Neurological Screen: Assessed 1/2/18        Myotomes:  Strong left lower extremity, weak right ankle/foot due to recent procedure        Dermatomes: Altered sensation right foot/ankle        Reflexes:    Functional Mobility: Assessed 1/24/18 Pecolia Hashimoto is modified independent with functional mobility and activities of daily living with use of scooter, she notes she has tried to shift weight to right lower extremity with use of rolling walker but has difficulty - still not attempting to ambulate with assistive device   Balance:  Assessed 1/2/17         Poor standing dynamic balance - fair standing dynamic balance with use of scooter  Mental Status:  Assessed 1/2/17        Alert and oriented x 4   Body Structures Involved:  1. Nerves  2. Bones  3. Joints  4. Muscles  5. Ligaments Body Functions Affected:  1. Sensory/Pain  2. Neuromusculoskeletal  3. Movement Related  4. Skin Related Activities and Participation Affected:  1. General Tasks and Demands  2. Mobility  3. Self Care  4. Domestic Life  5. Interpersonal Interactions and Relationships  6.  Community, Social and [place]   Number of elements that affect the Plan of Care: 3: MODERATE COMPLEXITY   CLINICAL PRESENTATION:   Presentation: Evolving clinical presentation with changing clinical characteristics: MODERATE COMPLEXITY   CLINICAL DECISION MAKING:   Outcome Measure: Assessed 1/24/18  Tool Used: PT/OT FOOT AND ANKLE ABILITY MEASURE  Score:  Initial: 1/84 3/84   Interpretation of Score: For the \"Activities of Daily Living\", there are 21 questions each scored on a 5 point scale with 0 representing \"Unable to do\" and 4 representing \"No difficulty\". The lower the score, the greater the functional disability. 84/84 represents no disability. Minimal detectable change is 5.7 points. With the addition of the 8 questions in the \"Sports Subscale,\" there are 29 questions, each scored on a 5 point scale with 0 representing \"Unable to do\" and 4 representing \"No difficulty\". The lower the score, the greater the functional disability. 116/116 represents no disability. Minimal detectable change is 12.3 points. Activities of Daily Living:  Score 84 83-68 67-51 50-34 33-18 17-1 0   Modifier CH CI CJ CK CL CM CN     Medical Necessity:   · Patient is expected to demonstrate progress in strength, range of motion, balance, coordination and functional technique to increase independence with pain free functional mobility. · Patient demonstrates good rehab potential due to higher previous functional level. Reason for Services/Other Comments:  · Patient continues to require skilled intervention due to decreased independence with functional mobility.    Use of outcome tool(s) and clinical judgement create a POC that gives a: Questionable prediction of patient's progress: MODERATE COMPLEXITY            TREATMENT:   (In addition to Assessment/Re-Assessment sessions the following treatments were rendered)  Pre-treatment Symptoms/Complaints:  Blake Trent notes 6/10 pain right foot an ankle - notes she is feeling better with progress  Pain: Initial: 6/10 (In addition to Assessment/Re-Assessment sessions the following treatments were rendered)  Therapeutic Exercise: ( 38  minutes):  Exercises per grid below to improve mobility, strength and coordination. Required minimal visual and verbal cues to promote proper body alignment and promote proper body posture. Progressed range and complexity of movement as indicated. 1. Gentle active range of motion right for improved mobility and strength - ABC, dorsiflexion/plantarflexion, inversion/eversion, circles Not performed this date     2. Gentle passive dorsiflexion stretching with towel with foot inverted to reduce tibial nerve tension 3 sets 30 seconds right    3. Tibial nerve gliding technique with foot plantarflexed and inverted and short arc quads 20 repetitions right Not performed this date     4. Gentle weight bearing dorsiflexion stretch in sitting with incline board and foot inverted 3 sets 30 seconds right Not performed this date     5. NuStep for close chain dynamic mobility right ankle/foot level 3 10 minutes - walking boot donned    6. Ankle strengthening with red theraband resistance - dorsiflexion, plantarflexion, eversion, inversion 20 repetitions     7. Standing in parallel bars with attempting to put weight through right lower extremity 2 minutes - on force plate with balance master this date and use of rolling walker and towel roll under right heel 23% weightbearing right lower extremity, boot donned with 35% weightbearing right lower extremity 5 repetitions     8. Ambulation with rolling walker with cues for equal step lengths 2 sets 100 feet - dons walking boot right lower extremity     9. Sitting on jada disc with right foot on floor and left LAQ and marching and hamstring curls with red theraband resistance (bilaterally) for weightbearing through right lower extremity and dynamic stability right ankle Not performed this date     10.  Sitting heel/toe raises and inversion for improved strength and mobility 20 repetitions each bilaterally     11. Standing with right foot on floor with towel under heel and use of rolling walker with quickly lifting left lower extremity off floor 5 repetitions for improved weight bearing over right lower extremity - significant weight through arms this date     Manual Therapy (     ):     Therapeutic Modalities:                                                                                                 Treatment/Session Assessment:    Response to Treatment:  Obdulia Carmona notes 6/10 pain right foot - improved ability to tolerate progression of therapeutic exercises  Pain: Post Treatment Session: 6/10  · Compliance with Program/Exercises: Will assess as treatment progresses. · Recommendations/Intent for next treatment session: \"Next visit will focus on advancements to more challenging activities and aquatic therapy for improved weight bearing right lower extremity\".   Total Treatment Duration:  PT Patient Time In/Time Out  Time In: 1115  Time Out: [address], PT

## 2018-02-27 ENCOUNTER — HOSPITAL ENCOUNTER (OUTPATIENT)
Dept: PHYSICAL THERAPY | Age: 33
Discharge: HOME | End: 2018-02-27
Payer: COMMERCIAL

## 2018-02-27 PROCEDURE — 97113 AQUATIC THERAPY/EXERCISES: CPT

## 2018-02-27 NOTE — PROGRESS NOTES
Abiola Roldan  : 1985 Abiola Roldan  : 1985  Payor: Javier Holbrook / Plan: [facility] OF [address] / Product Type: PPO /  [address]  at [facility]  [address], [address], [address]  [phone]   [phone]              OUTPATIENT PHYSICAL THERAPY: Daily Note and Aquatic Note 2018    ICD-10: Treatment Diagnosis: pain in right foot (M79.671)  Difficulty in walking, not elsewhere classified (R26.2)  Precautions/Allergies:   None per Abiola Roldan   Fall Risk Score: 1 (? 5 = High Risk)  MD Orders: Evaluate and treat MEDICAL/REFERRING DIAGNOSIS:  Pain, foot, right, chronic [M79.671, G89.29]  DATE OF ONSET: date of surgery 17  REFERRING PHYSICIAN: Abebe Vaughn MD; Baldev Melendez MD  RETURN PHYSICIAN APPOINTMENT: none schedule at this time   Patient has attended 8 physical therapy session including initial evaluation. INITIAL ASSESSMENT:  Ms. Salome Do presents with improving pain right foot and continued difficulty walking status post tarsal tunnel decompression on 17 with the [facility]. She had an ankle injury in 2017 that left her unable to bear weight through her right lower extremity. [facility] gave her a preliminary diagnosis of CRPS but then performed tarsal tunnel decompression right. She is cleared for weight bearing as tolerated status post procedure. She currently uses a knee scooter to get around. She holds her foot in dorsiflexed and inverted position - but positioning is decreasing at rest. She does not like to touch her foot to floor. She presents with improving range of motion right foot and ankle. She presents with decreased independence with functional mobility and activities of daily living. She would benefit from skilled physical therapy in order to restore prior level of function and prevent future impairment. PROBLEM LIST (Impacting functional limitations):  1. Decreased Strength  2.  Decreased ADL/Functional Activities  3. Decreased Transfer Abilities  4. Decreased Ambulation Ability/Technique  5. Decreased Balance  6. Increased Pain  7. Decreased Activity Tolerance  8. Decreased Pacing Skills  9. Decreased Work Simplification/Energy Conservation Techniques  10. Decreased Flexibility/Joint Mobility  11. Edema/Girth  12. Decreased [place] with Home Exercise Program INTERVENTIONS PLANNED:  1. Balance Exercise  2. Cold  3. Family Education  4. Gait Training  5. Heat  6. Home Exercise Program (HEP)  7. Manual Therapy  8. Neuromuscular Re-education/Strengthening  9. Range of Motion (ROM)  10. Therapeutic Activites  11. Therapeutic Exercise/Strengthening  12. Transcutaneous Electrical Nerve Stimulation (TENS)  13. Transfer Training  14. Ultrasound (US)  15. orthotic management and training   TREATMENT PLAN:  Effective Dates: 1/2/18 TO 4/2/18. Frequency/Duration: 2 times a week for 8 weeks  GOALS: (Goals have been discussed and agreed upon with patient.)  Discharge Goals: Time Frame: 8 weeks  1. Patient will be independent with home exercise program within 8 weeks in order to improve independence with management of patient's symptoms and/or functional deficits. (CONTINUE 1/24/18)  2. Patient will improve their foot and ankle ability measure score to 20/84 within 8 weeks in order to improve pain free independence with functional mobility. (CONTINUE 1/24/18)  3. Patient will ambulate with equal weight bearing with use of least restrictive device within 8 weeks in order to improve pain free independence with functional mobility. (CONTINUE 1/24/18)  Rehabilitation Potential For Stated Goals: Good              The information in this section was collected on 1/2/18 (except where otherwise noted). HISTORY:   History of Present Injury/Illness (Reason for Referral):  Ms. Peter Muro presents with increased pain right foot and difficulty walking status post tarsal tunnel decompression on 12/7/17 with the [facility]. She had an ankle injury in April 2017 that left her unable to bear weight through her right lower extremity. [address] gave her a preliminary diagnosis of CRPS but then performed tarsal tunnel decompression right. She is cleared for weight bearing as tolerated status post procedure. She currently uses a knee scooter to get around. She holds her foot in dorsiflexed and inverted position. She does not like to touch her foot to floor. 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 Current Medications: vuvanse sonata, birth control, advil, tylenol    Date Last Reviewed:  2/27/2018   Number of Personal Factors/Comorbidities that affect the Plan of Care: 1-2: MODERATE COMPLEXITY   EXAMINATION:   Observation/Orthostatic Postural Assessment:  Assessed 1/24/18 Padma Askew sits with forward head and rounded shoulders which indicate tight anterior chest musculature, upper trapezius, and levator scapula and weak posterior scapula musculature and deep cervical flexors. She holds her foot in dorsiflexed and inverted position - decreased guarded positioning.  She does not touch foot to floor when sitting  Palpation:  Assessed 1/2/18         Caprice Formica notes tenderness with palpation right plantar fascia, decreased myofascial mobility right foot/ankle and surrounding incision, increased edema right ankle/foot  ROM:  Assessed 1/24/18  Left ankle dorsiflexion active range of motion with knee flexed: 10 degrees  Left ankle dorsiflexion active range of motion with knee extended: 10 degrees  Left ankle dorsiflexion passive range of motion with knee extended: 20 degrees  Left ankle plantarflexion active range of motion: 35 degrees    Right ankle dorsiflexion active range of motion with knee flexed: 10 degrees  Rightt ankle dorsiflexion active range of motion with knee extended: 10 degrees  Right ankle dorsiflexion passive range of motion with knee extended: 15 degrees  Rightt ankle plantarflexion active range of motion: 30 degrees  At least 30 degrees of ankle inversion/eversion passive range of motion    Right first ray dorsiflexed with hypomobility plantarflexion   Strength:  Assessed 1/24/18         At least 3/5 strength right ankle - will continue to assess as appropriate  4+/5 left ankle and lower extremity all major muscle groups  Special Tests:    Neurological Screen: Assessed 1/2/18        Myotomes:  Strong left lower extremity, weak right ankle/foot due to recent procedure        Dermatomes: Altered sensation right foot/ankle        Reflexes:    Functional Mobility: Assessed 1/24/18 Lubna Conde is modified independent with functional mobility and activities of daily living with use of scooter, she notes she has tried to shift weight to right lower extremity with use of rolling walker but has difficulty - still not attempting to ambulate with assistive device   Balance:  Assessed 1/2/17         Poor standing dynamic balance - fair standing dynamic balance with use of scooter  Mental Status:  Assessed 1/2/17        Alert and oriented x 4   Body Structures Involved:  1. Nerves  2. Bones  3. Joints  4. Muscles  5. Ligaments Body Functions Affected:  1. Sensory/Pain  2. Neuromusculoskeletal  3. Movement Related  4. Skin Related Activities and Participation Affected:  1. General Tasks and Demands  2. Mobility  3. Self Care  4. Domestic Life  5. Interpersonal Interactions and Relationships  6.  Community, Social and [place] Number of elements that affect the Plan of Care: 3: MODERATE COMPLEXITY   CLINICAL PRESENTATION:   Presentation: Evolving clinical presentation with changing clinical characteristics: MODERATE COMPLEXITY   CLINICAL DECISION MAKING:   Outcome Measure: Assessed 1/24/18  Tool Used: PT/OT FOOT AND ANKLE ABILITY MEASURE  Score:  Initial: 1/84 3/84   Interpretation of Score: For the \"Activities of Daily Living\", there are 21 questions each scored on a 5 point scale with 0 representing \"Unable to do\" and 4 representing \"No difficulty\". The lower the score, the greater the functional disability. 84/84 represents no disability. Minimal detectable change is 5.7 points. With the addition of the 8 questions in the \"Sports Subscale,\" there are 29 questions, each scored on a 5 point scale with 0 representing \"Unable to do\" and 4 representing \"No difficulty\". The lower the score, the greater the functional disability. 116/116 represents no disability. Minimal detectable change is 12.3 points. Activities of Daily Living:  Score 84 83-68 67-51 50-34 33-18 17-1 0   Modifier CH CI CJ CK CL CM CN     Medical Necessity:   · Patient is expected to demonstrate progress in strength, range of motion, balance, coordination and functional technique to increase independence with pain free functional mobility. · Patient demonstrates good rehab potential due to higher previous functional level. Reason for Services/Other Comments:  · Patient continues to require skilled intervention due to decreased independence with functional mobility. Use of outcome tool(s) and clinical judgement create a POC that gives a: Questionable prediction of patient's progress: MODERATE COMPLEXITY            TREATMENT:   (In addition to Assessment/Re-Assessment sessions the following treatments were rendered)  Pre-treatment Symptoms/Complaints:  Marella Lefort notes pain to be 8/10 this morning.  She reports she forgot to take tylenol since they were in a dulce.       Pain: Initial:     7/10     Aquatic Therapy ( 45 minutes): Aquatic treatment performed per flow grid for Decreased muscle strength, Decreased endurance and Decreased activity endurance. Cues provided for alignment and weight bearing. .  Assistance by therapist provided for patient to be able to weight bear on R LE.     Aquatic Exercise Log       Date  1-23-18 1-25-18 1-30-18 2-1-18 2-6-18 2-27-18   Activity/ Exercise Parameters     Patient in and out of pool by steps    Walking forward Seated on noodle - 4 laps and 6 laps with rail and HHA in 3.5 ft Seated on noodle - 4 laps   Walking with rail and HHA - 6 laps Seated on noodle - 4 laps   Walking 8 laps with rail and HHA Seated on noodle - 4 laps   Walking - 6 laps with rail and HHA - emphasis on putting weight through foot. Seated on noodle - 4 laps   Standing with rail and HHA x 6 with emphasis on weight bearing.   4 laps with rail and therapist hand   Walking backward Seated on noodle - 4 laps and 2 laps with rail and HHA  In 3.5 ft Seated on noodle - 4 laps   Walking with rail and HHA - 2 laps  Seated on noodle - 4 laps  Seated on noodle - 4 laps  Seated on noodle - 4 laps    Walking sideways Seated on noodle - wide and narrow knees - 2 laps each then 4 laps with rail in 3.5 ft Walking in shallow and deeper - 4 laps  With rail Seated on noodle - 2 positions - 4 laps each Seated on noodle - 2 laps in wide steps  2 laps in narrow steps  4 laps with rail       Marching In place x 20  In place tapping foot x 10 B          Goose Step             Tip toes             Heels             Lunges          Side step squats          LE Exercises noodle    Noodle  Noodle       Hip Flex/Ext Marching x 15 R    Marching x 15 R  X 5 L Squats on noodle 2 x 10       Hip Abd/Add X 15 R    X 15 R  X 5 L       Hip IR/ER             Leg press   Against wall - both feet x 10   With R only pushing through heel - x 10   Against wall   X 10 B   X 5 R  Against wall   X 10 B  X 10 R  While pushing against therapist      Knee Flex X 15 B  Knee flexed - R quad stretch with noodle  Knee flexed - R quad stretch         Squats X 10            Leg Circles Deeper water x 10 each way each leg Deeper water x 10 each way each leg Deeper water x 10 each way each leg Deeper water x 10 each way each leg Deep water x 10 each way each leg       Step Ups Red step 2 x 10 B  Big step x 10  Big step   X 10 B Big step ups x 10  First step coming out x 10 leading with R with therapist foot under hers to encourage weight bearing Bog step ups x 10 B Step ups x 20 B   UE Exercises             Squeeze In             Push Down             Pull Down             Bicep/Tricep          Rows/Press outs           Chi Positions             Trunk Rotation          Deep H2O/ Noodles Noodle and or rings Noodle and rings Noodle and rings Noodle and rings rings rings      Stabilization Core stability            Arms only Straddle - 2 laps Straddle - 2 laps Straddle - 2 laps  Straddle -  2 laps        Legs only Straddle - 2 laps  Both - 2 laps Straddle - 2 laps   Both - 4 laps Straddle - 4 laps   Both - 2 laps  Straddle - 4 laps   Both - 2 laps  - 4 laps  Both - 2 laps Straddle - 4 laps    [place]   [place] 2 min 2.5 min 2.5 min  3 min 3 min      Scissors 2 min  Combo x 2 min 2.5 min  Combo   2.5 min 2.5 min  3 min  Combo - 3 min   3 min  Combo   3 min      Crab walk 4 laps 4 laps  4 laps  4 laps     Lower abdominal   work  Knees to chest x 10   Knees to chest   2 x 10  SLS activities with lots of encouragement to do task.    SLS activities  Holding core tight 5 x 10 sec hold on R LE      Cardio     Weight shifting to SLS to each side x 10         Jogging 2 min in deeper water  In deep - 3 min  In deep - jog and punch - 3 minutes    Lap   Swimming             Stretches  on bench  On bench  On bench  At wall  On bench       Hamstrings X 3 B X 3 B X 3 B X 3 B X 3 B       Heelcords X 3 B X 3 B X 3 B X 3 B X 3 B       Piriformis X 3 B X 3 B X 3 B      R foot  By therapist By therapist By therapist  By therapist                         Treatment/Session Assessment:  Patient doing more activities with weight bearing in the pool. She entered and exited pool via steps with rail and assist of therapist today. Mother was present for therapy session today. Response to Treatment:  Cecy Goetz notes 8/10 pain following therapy session. Pain: Post Treatment Session: 8/10  · Compliance with Program/Exercises: Will assess as treatment progresses. · Recommendations/Intent for next treatment session: \"Next visit will focus on advancements to more challenging activities and aquatic therapy for improved weight bearing right lower extremity\".   Total Treatment Duration:    PT Patient Time In/Time Out  Time In: 1345  Time Out: [facility] , PTA

## 2018-02-28 ENCOUNTER — HOSPITAL ENCOUNTER (OUTPATIENT)
Dept: PHYSICAL THERAPY | Age: 33
Discharge: HOME | End: 2018-02-28
Payer: COMMERCIAL

## 2018-02-28 PROCEDURE — 97110 THERAPEUTIC EXERCISES: CPT

## 2018-02-28 NOTE — PROGRESS NOTES
Abiola Roldan  : 1985 Gailluz maria [place]  : 1985  Payor: Javier Holbrook / Plan: [facility] CROSS OF [address]  / Product Type: PPO /  [facility] at [facility]  [address] [facility], [place] [address] [address] [facility]  [phone]   [phone]              OUTPATIENT PHYSICAL THERAPY: Daily Note 2018    ICD-10: Treatment Diagnosis: pain in right foot (M79.671)  Difficulty in walking, not elsewhere classified (R26.2)  Precautions/Allergies:   None per Abiola Roldan   Fall Risk Score: 1 (? 5 = High Risk)  MD Orders: Evaluate and treat MEDICAL/REFERRING DIAGNOSIS:  Pain, foot, right, chronic [M79.671, G89.29]  DATE OF ONSET: date of surgery 17  REFERRING PHYSICIAN: Abebe Vaughn MD; Baldev Melendez MD  RETURN PHYSICIAN APPOINTMENT: none schedule at this time   Patient has attended 18 physical therapy session including initial evaluation. INITIAL ASSESSMENT:  Ms. Salome Do presents with improving pain right foot and continued difficulty walking status post tarsal tunnel decompression on 17 with the [facility]. She had an ankle injury in 2017 that left her unable to bear weight through her right lower extremity. [facility] gave her a preliminary diagnosis of CRPS but then performed tarsal tunnel decompression right. She is cleared for weight bearing as tolerated status post procedure. She currently uses a knee scooter to get around. She holds her foot in dorsiflexed and inverted position - but positioning is decreasing at rest. She does not like to touch her foot to floor. She presents with improving range of motion right foot and ankle. She presents with decreased independence with functional mobility and activities of daily living. She would benefit from skilled physical therapy in order to restore prior level of function and prevent future impairment. PROBLEM LIST (Impacting functional limitations):  1. Decreased Strength  2.  Decreased ADL/Functional Activities  3. Decreased Transfer Abilities  4. Decreased Ambulation Ability/Technique  5. Decreased Balance  6. Increased Pain  7. Decreased Activity Tolerance  8. Decreased Pacing Skills  9. Decreased Work Simplification/Energy Conservation Techniques  10. Decreased Flexibility/Joint Mobility  11. Edema/Girth  12. Decreased [place] with Home Exercise Program INTERVENTIONS PLANNED:  1. Balance Exercise  2. Cold  3. Family Education  4. Gait Training  5. Heat  6. Home Exercise Program (HEP)  7. Manual Therapy  8. Neuromuscular Re-education/Strengthening  9. Range of Motion (ROM)  10. Therapeutic Activites  11. Therapeutic Exercise/Strengthening  12. Transcutaneous Electrical Nerve Stimulation (TENS)  13. Transfer Training  14. Ultrasound (US)  15. orthotic management and training   TREATMENT PLAN:  Effective Dates: 1/2/18 TO 4/2/18. Frequency/Duration: 2 times a week for 8 weeks  GOALS: (Goals have been discussed and agreed upon with patient.)  Discharge Goals: Time Frame: 8 weeks  1. Patient will be independent with home exercise program within 8 weeks in order to improve independence with management of patient's symptoms and/or functional deficits. (CONTINUE 1/24/18)  2. Patient will improve their foot and ankle ability measure score to 20/84 within 8 weeks in order to improve pain free independence with functional mobility. (CONTINUE 1/24/18)  3. Patient will ambulate with equal weight bearing with use of least restrictive device within 8 weeks in order to improve pain free independence with functional mobility. (CONTINUE 1/24/18)  Rehabilitation Potential For Stated Goals: Good              The information in this section was collected on 1/2/18 (except where otherwise noted). HISTORY:   History of Present Injury/Illness (Reason for Referral):  Ms. Saroj Ivory presents with increased pain right foot and difficulty walking status post tarsal tunnel decompression on 12/7/17 with the [facility].  She had an ankle injury in April 2017 that left her unable to bear weight through her right lower extremity. [address] gave her a preliminary diagnosis of CRPS but then performed tarsal tunnel decompression right. She is cleared for weight bearing as tolerated status post procedure. She currently uses a knee scooter to get around. She holds her foot in dorsiflexed and inverted position. She does not like to touch her foot to floor. 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 Current Medications: vuvanse sonata, birth control, advil, tylenol    Date Last Reviewed:  2/28/2018   Number of Personal Factors/Comorbidities that affect the Plan of Care: 1-2: MODERATE COMPLEXITY   EXAMINATION:   Observation/Orthostatic Postural Assessment:  Assessed 1/24/18 Pamda Askew sits with forward head and rounded shoulders which indicate tight anterior chest musculature, upper trapezius, and levator scapula and weak posterior scapula musculature and deep cervical flexors. She holds her foot in dorsiflexed and inverted position - decreased guarded positioning.  She does not touch foot to floor when sitting  Palpation:  Assessed 1/2/18         Caprice Formica notes tenderness with palpation right plantar fascia, decreased myofascial mobility right foot/ankle and surrounding incision, increased edema right ankle/foot  ROM:  Assessed 1/24/18  Left ankle dorsiflexion active range of motion with knee flexed: 10 degrees  Left ankle dorsiflexion active range of motion with knee extended: 10 degrees  Left ankle dorsiflexion passive range of motion with knee extended: 20 degrees  Left ankle plantarflexion active range of motion: 35 degrees    Right ankle dorsiflexion active range of motion with knee flexed: 10 degrees  Rightt ankle dorsiflexion active range of motion with knee extended: 10 degrees  Right ankle dorsiflexion passive range of motion with knee extended: 15 degrees  Rightt ankle plantarflexion active range of motion: 30 degrees  At least 30 degrees of ankle inversion/eversion passive range of motion    Right first ray dorsiflexed with hypomobility plantarflexion   Strength:  Assessed 1/24/18         At least 3/5 strength right ankle - will continue to assess as appropriate  4+/5 left ankle and lower extremity all major muscle groups  Special Tests:    Neurological Screen: Assessed 1/2/18        Myotomes:  Strong left lower extremity, weak right ankle/foot due to recent procedure        Dermatomes: Altered sensation right foot/ankle        Reflexes:    Functional Mobility: Assessed 1/24/18 Jr Hawkins is modified independent with functional mobility and activities of daily living with use of scooter, she notes she has tried to shift weight to right lower extremity with use of rolling walker but has difficulty - still not attempting to ambulate with assistive device   Balance:  Assessed 1/2/17         Poor standing dynamic balance - fair standing dynamic balance with use of scooter  Mental Status:  Assessed 1/2/17        Alert and oriented x 4   Body Structures Involved:  1. Nerves  2. Bones  3. Joints  4. Muscles  5. Ligaments Body Functions Affected:  1. Sensory/Pain  2. Neuromusculoskeletal  3. Movement Related  4. Skin Related Activities and Participation Affected:  1. General Tasks and Demands  2. Mobility  3. Self Care  4. Domestic Life  5. Interpersonal Interactions and Relationships  6.  Community, Social and [place]   Number of elements that affect the Plan of Care: 3: MODERATE COMPLEXITY   CLINICAL PRESENTATION:   Presentation: Evolving clinical presentation with changing clinical characteristics: MODERATE COMPLEXITY   CLINICAL DECISION MAKING:   Outcome Measure: Assessed 1/24/18  Tool Used: PT/OT FOOT AND ANKLE ABILITY MEASURE  Score:  Initial: 1/84 3/84   Interpretation of Score: For the \"Activities of Daily Living\", there are 21 questions each scored on a 5 point scale with 0 representing \"Unable to do\" and 4 representing \"No difficulty\". The lower the score, the greater the functional disability. 84/84 represents no disability. Minimal detectable change is 5.7 points. With the addition of the 8 questions in the \"Sports Subscale,\" there are 29 questions, each scored on a 5 point scale with 0 representing \"Unable to do\" and 4 representing \"No difficulty\". The lower the score, the greater the functional disability. 116/116 represents no disability. Minimal detectable change is 12.3 points. Activities of Daily Living:  Score 84 83-68 67-51 50-34 33-18 17-1 0   Modifier CH CI CJ CK CL CM CN     Medical Necessity:   · Patient is expected to demonstrate progress in strength, range of motion, balance, coordination and functional technique to increase independence with pain free functional mobility. · Patient demonstrates good rehab potential due to higher previous functional level. Reason for Services/Other Comments:  · Patient continues to require skilled intervention due to decreased independence with functional mobility. Use of outcome tool(s) and clinical judgement create a POC that gives a: Questionable prediction of patient's progress: MODERATE COMPLEXITY            TREATMENT:   (In addition to Assessment/Re-Assessment sessions the following treatments were rendered)  Pre-treatment Symptoms/Complaints:  Cecy Goetz notes pain this morning to be 8/10.  She reports she is feeling more comfortable with putting more weight on her foot. Pain: Initial: 8/10           Therapeutic Exercise: ( 45  minutes):  Exercises per grid below to improve mobility, strength and coordination. Required minimal visual and verbal cues to promote proper body alignment and promote proper body posture. Progressed range and complexity of movement as indicated. 1. Gentle active range of motion right for improved mobility and strength - ABC, dorsiflexion/plantarflexion, inversion/eversion, circles Not performed this date     2. Gentle passive dorsiflexion stretching with towel with foot inverted to reduce tibial nerve tension 3 sets 30 seconds right    3. Tibial nerve gliding technique with foot plantarflexed and inverted and short arc quads 20 repetitions right Not performed this date     4. Gentle weight bearing dorsiflexion stretch in sitting with incline board and foot inverted 3 sets 30 seconds right Not performed this date     5. NuStep for close chain dynamic mobility right ankle/foot level 3 10 minutes - walking boot doffed    6. Ankle strengthening with red theraband resistance - dorsiflexion, plantarflexion, eversion, inversion 20 repetitions - Not performed this date    7. Standing in parallel bars with attempting to put weight through right lower extremity 2 minutes - on force plate with balance master this date and use of rolling walker and towel roll under right heel 23% weightbearing right lower extremity x 3 reps then 25% weight bearing right lower extremity x 4 reps, boot donned with 35% weightbearing right lower extremity 5 repetitions     8. Ambulation with rolling walker with cues for equal step lengths 2 sets 100 feet - dons walking boot right lower extremity     9. Sitting on jada disc with right foot on floor and left LAQ and marching and hamstring curls with red theraband resistance (bilaterally) for weightbearing through right lower extremity and dynamic stability right ankle Not performed this date     10.  Sitting heel/toe raises and inversion for improved strength and mobility 20 repetitions each bilaterally     11. Standing with right foot on floor with towel under heel and use of rolling walker with quickly lifting left lower extremity off floor 5 repetitions for improved weight bearing over right lower extremity - significant weight through arms this date - not performed this date    Manual Therapy (     ):     Therapeutic Modalities:                                                                                                 Treatment/Session Assessment:  Patient trying to lean with head to meet weight bearing status on R LE. Verbal and tactile cues to shift at hips with core solid and keeping R knee straight. She was able to perform with less anxiety today per mother. Response to Treatment:  Verna Haynes notes 8/10 pain right foot - improved ability to tolerate progression of therapeutic exercises  Pain: Post Treatment Session: 8.5/10  · Compliance with Program/Exercises: Will assess as treatment progresses. · Recommendations/Intent for next treatment session: \"Next visit will focus on advancements to more challenging activities and aquatic therapy for improved weight bearing right lower extremity\".   Total Treatment Duration:  PT Patient Time In/Time Out  Time In: 0930  Time Out: [address], PTA

## 2018-03-01 ENCOUNTER — HOSPITAL ENCOUNTER (OUTPATIENT)
Dept: PHYSICAL THERAPY | Age: 33
Discharge: HOME | End: 2018-03-01
Payer: COMMERCIAL

## 2018-03-01 NOTE — PROGRESS NOTES
Vishnu Love  : 1985  Primary: Christella [facility] Of [facility]ili Bocanegra*  Secondary:  [facility]  [address] [facility], [address] [place] [address], [address]  [phone]   [phone]        OUTPATIENT DAILY NOTE    NAME/AGE/GENDER: Vishnu Love is a 34 y.o. female. DATE: 3/1/2018    Patient canceled physical therapy appointment today due to work conflict. Will plan to follow up on next scheduled visit.     Bere Silverman, PT

## 2018-03-02 ENCOUNTER — HOSPITAL ENCOUNTER (OUTPATIENT)
Dept: PHYSICAL THERAPY | Age: 33
Discharge: HOME | End: 2018-03-02
Payer: COMMERCIAL

## 2018-03-02 PROCEDURE — 97110 THERAPEUTIC EXERCISES: CPT

## 2018-03-02 PROCEDURE — 97140 MANUAL THERAPY 1/> REGIONS: CPT

## 2018-03-02 NOTE — PROGRESS NOTES
Carthagemaurilio Trent  : 1985 Carthagemaurilio Trent  : 1985  Payor: Graciela Daily / Plan: [place] [employer]OSS OF [address] / Product Type: PPO /  Therapy Center at [address] [place] [address] 68, 0[address] [facility], [place] [address] [address] [facility]  [phone]   [phone]              OUTPATIENT PHYSICAL THERAPY: Daily Note 3/6/2018    ICD-10: Treatment Diagnosis: pain in right foot (M79.671)  Difficulty in walking, not elsewhere classified (R26.2)  Precautions/Allergies:   None per Blake Trent   Fall Risk Score: 1 (? 5 = High Risk)  MD Orders: Evaluate and treat MEDICAL/REFERRING DIAGNOSIS:  Pain, foot, right, chronic [M79.671, G89.29]  DATE OF ONSET: date of surgery 17  REFERRING PHYSICIAN: Nicolette Vazquez MD; Regine Dominguez MD  RETURN PHYSICIAN APPOINTMENT: none schedule at this time   Patient has attended 19 physical therapy session including initial evaluation. INITIAL ASSESSMENT:  Ms. Richard Willis presents with improving pain right foot and continued difficulty walking status post tarsal tunnel decompression on 17 with the [facility]. She had an ankle injury in 2017 that left her unable to bear weight through her right lower extremity. [facility] gave her a preliminary diagnosis of CRPS but then performed tarsal tunnel decompression right. She is cleared for weight bearing as tolerated status post procedure. She currently uses a knee scooter to get around. She holds her foot in dorsiflexed and inverted position - but positioning is decreasing at rest. She does not like to touch her foot to floor. She presents with improving range of motion right foot and ankle. She presents with decreased independence with functional mobility and activities of daily living. She would benefit from skilled physical therapy in order to restore prior level of function and prevent future impairment. PROBLEM LIST (Impacting functional limitations):  1. Decreased Strength  2.  Decreased ADL/Functional Activities  3. Decreased Transfer Abilities  4. Decreased Ambulation Ability/Technique  5. Decreased Balance  6. Increased Pain  7. Decreased Activity Tolerance  8. Decreased Pacing Skills  9. Decreased Work Simplification/Energy Conservation Techniques  10. Decreased Flexibility/Joint Mobility  11. Edema/Girth  12. Decreased [place] with Home Exercise Program INTERVENTIONS PLANNED:  1. Balance Exercise  2. Cold  3. Family Education  4. Gait Training  5. Heat  6. Home Exercise Program (HEP)  7. Manual Therapy  8. Neuromuscular Re-education/Strengthening  9. Range of Motion (ROM)  10. Therapeutic Activites  11. Therapeutic Exercise/Strengthening  12. Transcutaneous Electrical Nerve Stimulation (TENS)  13. Transfer Training  14. Ultrasound (US)  15. orthotic management and training   TREATMENT PLAN:  Effective Dates: 1/2/18 TO 4/2/18. Frequency/Duration: 2 times a week for 8 weeks  GOALS: (Goals have been discussed and agreed upon with patient.)  Discharge Goals: Time Frame: 8 weeks  1. Patient will be independent with home exercise program within 8 weeks in order to improve independence with management of patient's symptoms and/or functional deficits. (CONTINUE 1/24/18)  2. Patient will improve their foot and ankle ability measure score to 20/84 within 8 weeks in order to improve pain free independence with functional mobility. (CONTINUE 1/24/18)  3. Patient will ambulate with equal weight bearing with use of least restrictive device within 8 weeks in order to improve pain free independence with functional mobility. (CONTINUE 1/24/18)  Rehabilitation Potential For Stated Goals: Good              The information in this section was collected on 1/2/18 (except where otherwise noted). HISTORY:   History of Present Injury/Illness (Reason for Referral):  Ms. Ricky Marquez presents with increased pain right foot and difficulty walking status post tarsal tunnel decompression on 12/7/17 with the [facility].  She had an ankle injury in April 2017 that left her unable to bear weight through her right lower extremity. [address] gave her a preliminary diagnosis of CRPS but then performed tarsal tunnel decompression right. She is cleared for weight bearing as tolerated status post procedure. She currently uses a knee scooter to get around. She holds her foot in dorsiflexed and inverted position. She does not like to touch her foot to floor. 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 Current Medications: vuvanse sonata, birth control, advil, tylenol    Date Last Reviewed:  3/6/2018   Number of Personal Factors/Comorbidities that affect the Plan of Care: 1-2: MODERATE COMPLEXITY   EXAMINATION:   Observation/Orthostatic Postural Assessment:  Assessed 1/24/18 Pecolia Hashimoto sits with forward head and rounded shoulders which indicate tight anterior chest musculature, upper trapezius, and levator scapula and weak posterior scapula musculature and deep cervical flexors. She holds her foot in dorsiflexed and inverted position - decreased guarded positioning.  She does not touch foot to floor when sitting  Palpation:  Assessed 1/2/18         Pecolia Hashimoto notes tenderness with palpation right plantar fascia, decreased myofascial mobility right foot/ankle and surrounding incision, increased edema right ankle/foot  ROM:  Assessed 1/24/18  Left ankle dorsiflexion active range of motion with knee flexed: 10 degrees  Left ankle dorsiflexion active range of motion with knee extended: 10 degrees  Left ankle dorsiflexion passive range of motion with knee extended: 20 degrees  Left ankle plantarflexion active range of motion: 35 degrees    Right ankle dorsiflexion active range of motion with knee flexed: 10 degrees  Rightt ankle dorsiflexion active range of motion with knee extended: 10 degrees  Right ankle dorsiflexion passive range of motion with knee extended: 15 degrees  Rightt ankle plantarflexion active range of motion: 30 degrees  At least 30 degrees of ankle inversion/eversion passive range of motion    Right first ray dorsiflexed with hypomobility plantarflexion   Strength:  Assessed 1/24/18         At least 3/5 strength right ankle - will continue to assess as appropriate  4+/5 left ankle and lower extremity all major muscle groups  Special Tests:    Neurological Screen: Assessed 1/2/18        Myotomes:  Strong left lower extremity, weak right ankle/foot due to recent procedure        Dermatomes: Altered sensation right foot/ankle        Reflexes:    Functional Mobility: Assessed 1/24/18 Umberto Martino is modified independent with functional mobility and activities of daily living with use of scooter, she notes she has tried to shift weight to right lower extremity with use of rolling walker but has difficulty - still not attempting to ambulate with assistive device   Balance:  Assessed 1/2/17         Poor standing dynamic balance - fair standing dynamic balance with use of scooter  Mental Status:  Assessed 1/2/17        Alert and oriented x 4   Body Structures Involved:  1. Nerves  2. Bones  3. Joints  4. Muscles  5. Ligaments Body Functions Affected:  1. Sensory/Pain  2. Neuromusculoskeletal  3. Movement Related  4. Skin Related Activities and Participation Affected:  1. General Tasks and Demands  2. Mobility  3. Self Care  4. Domestic Life  5. Interpersonal Interactions and Relationships  6.  Community, Social and [employer]   Number of elements that affect the Plan of Care: 3: MODERATE COMPLEXITY   CLINICAL PRESENTATION:   Presentation: Evolving clinical presentation with changing clinical characteristics: MODERATE COMPLEXITY   CLINICAL DECISION MAKING:   Outcome Measure: Assessed 1/24/18  Tool Used: PT/OT FOOT AND ANKLE ABILITY MEASURE  Score:  Initial: 1/84 3/84   Interpretation of Score: For the \"Activities of Daily Living\", there are 21 questions each scored on a 5 point scale with 0 representing \"Unable to do\" and 4 representing \"No difficulty\". The lower the score, the greater the functional disability. 84/84 represents no disability. Minimal detectable change is 5.7 points. With the addition of the 8 questions in the \"Sports Subscale,\" there are 29 questions, each scored on a 5 point scale with 0 representing \"Unable to do\" and 4 representing \"No difficulty\". The lower the score, the greater the functional disability. 116/116 represents no disability. Minimal detectable change is 12.3 points. Activities of Daily Living:  Score 84 83-68 67-51 50-34 33-18 17-1 0   Modifier CH CI CJ CK CL CM CN     Medical Necessity:   · Patient is expected to demonstrate progress in strength, range of motion, balance, coordination and functional technique to increase independence with pain free functional mobility. · Patient demonstrates good rehab potential due to higher previous functional level. Reason for Services/Other Comments:  · Patient continues to require skilled intervention due to decreased independence with functional mobility. Use of outcome tool(s) and clinical judgement create a POC that gives a: Questionable prediction of patient's progress: MODERATE COMPLEXITY            TREATMENT:   (In addition to Assessment/Re-Assessment sessions the following treatments were rendered)  Pre-treatment Symptoms/Complaints:  Obdulia  notes pain this morning to be 7/10.   Pain: Initial: 8/10  Pain Intensity 1: 7  Pain Location 1: Foot  Pain Orientation 1: Right  Pain Intervention(s) 1: Exercise, Therapeutic touch        Therapeutic Exercise: ( 31  minutes):  Exercises per grid below to improve mobility, strength and coordination. Required minimal visual and verbal cues to promote proper body alignment and promote proper body posture. Progressed range and complexity of movement as indicated. 1. Gentle active range of motion right for improved mobility and strength - ABC, dorsiflexion/plantarflexion, inversion/eversion, circles Not performed this date     2. Gentle passive dorsiflexion stretching with towel with foot inverted to reduce tibial nerve tension 3 sets 30 seconds right    3. Tibial nerve gliding technique with foot plantarflexed and inverted and short arc quads 20 repetitions right Not performed this date     4. Gentle weight bearing dorsiflexion stretch in sitting with incline board and foot inverted 3 sets 30 seconds right Not performed this date     5. NuStep for close chain dynamic mobility right ankle/foot level 3 10 minutes - walking boot off    6. Ankle strengthening with red theraband resistance - dorsiflexion, plantarflexion, eversion, inversion 20 repetitions     7. Standing in parallel bars with attempting to put weight through right lower extremity 2 minutes - on force plate with balance master this date and use of rolling walker  30% weight bearing right lower extremity x 5 reps, boot donned with 40% weightbearing right lower extremity 5 repetitions     8. Ambulation with rolling walker with cues for equal step lengths 2 sets 100 feet - dons walking boot right lower extremity     9. Sitting on jada disc with right foot on floor and left LAQ and marching and hamstring curls with red theraband resistance (bilaterally) for weightbearing through right lower extremity and dynamic stability right ankle Not performed this date     10.  Sitting heel/toe raises and inversion for improved strength and mobility 20 repetitions each bilaterally - addition of pushing in to therapist hand for plantarflexion strength     11. Standing with right foot on floor with towel under heel and use of rolling walker with quickly lifting left lower extremity off floor 5 repetitions for improved weight bearing over right lower extremity - significant weight through arms this date     Manual Therapy (     10 minutes): Myofascial release to right foot and ankle for improved mobility, decreased sensitivity and decreased pain     Therapeutic Modalities:                                                                                                 Treatment/Session Assessment:  . Response to Treatment:  Cece Streeter able to demonstrate 40% weightbearing right lower extremity with walking boot donned, able to demonstrate 30% without boot or shoe  Pain: Post Treatment Session: 6/10  · Compliance with Program/Exercises: Will assess as treatment progresses. · Recommendations/Intent for next treatment session: \"Next visit will focus on advancements to more challenging activities and aquatic therapy for improved weight bearing right lower extremity\".   Total Treatment Duration:  PT Patient Time In/Time Out  Time In: 1500  Time Out: 2025 [facility],

## 2018-03-06 ENCOUNTER — HOSPITAL ENCOUNTER (OUTPATIENT)
Dept: PHYSICAL THERAPY | Age: 33
Discharge: HOME | End: 2018-03-06
Payer: COMMERCIAL

## 2018-03-06 PROCEDURE — 97113 AQUATIC THERAPY/EXERCISES: CPT

## 2018-03-06 NOTE — PROGRESS NOTES
Coby Sell  : 1985 Coby Sell  : 1985  Payor: Harvey Myers / Plan: [place] BLUE [place] OF Kunal Foster  / Product Type: PPO /  [address]  at [facility]  [address], [address], [place] [address] [address] [facility]  [phone]   [phone]              OUTPATIENT PHYSICAL THERAPY: Daily Note and Aquatic Note 3/6/2018    ICD-10: Treatment Diagnosis: pain in right foot (M79.671)  Difficulty in walking, not elsewhere classified (R26.2)  Precautions/Allergies:   None per [facility]   Fall Risk Score: 1 (? 5 = High Risk)  MD Orders: Evaluate and treat MEDICAL/REFERRING DIAGNOSIS:  Pain, foot, right, chronic [M79.671, G89.29]  DATE OF ONSET: date of surgery 17  REFERRING PHYSICIAN: Jonny Lazo MD; Haley Valencia MD  RETURN PHYSICIAN APPOINTMENT: none schedule at this time   Patient has attended 8 physical therapy session including initial evaluation. INITIAL ASSESSMENT:  Ms. Trista Jung presents with improving pain right foot and continued difficulty walking status post tarsal tunnel decompression on 17 with the [facility]. She had an ankle injury in 2017 that left her unable to bear weight through her right lower extremity. [facility] gave her a preliminary diagnosis of CRPS but then performed tarsal tunnel decompression right. She is cleared for weight bearing as tolerated status post procedure. She currently uses a knee scooter to get around. She holds her foot in dorsiflexed and inverted position - but positioning is decreasing at rest. She does not like to touch her foot to floor. She presents with improving range of motion right foot and ankle. She presents with decreased independence with functional mobility and activities of daily living. She would benefit from skilled physical therapy in order to restore prior level of function and prevent future impairment. PROBLEM LIST (Impacting functional limitations):  1. Decreased Strength  2.  Decreased ADL/Functional Activities  3. Decreased Transfer Abilities  4. Decreased Ambulation Ability/Technique  5. Decreased Balance  6. Increased Pain  7. Decreased Activity Tolerance  8. Decreased Pacing Skills  9. Decreased Work Simplification/Energy Conservation Techniques  10. Decreased Flexibility/Joint Mobility  11. Edema/Girth  12. Decreased [place] with Home Exercise Program INTERVENTIONS PLANNED:  1. Balance Exercise  2. Cold  3. Family Education  4. Gait Training  5. Heat  6. Home Exercise Program (HEP)  7. Manual Therapy  8. Neuromuscular Re-education/Strengthening  9. Range of Motion (ROM)  10. Therapeutic Activites  11. Therapeutic Exercise/Strengthening  12. Transcutaneous Electrical Nerve Stimulation (TENS)  13. Transfer Training  14. Ultrasound (US)  15. orthotic management and training   TREATMENT PLAN:  Effective Dates: 1/2/18 TO 4/2/18. Frequency/Duration: 2 times a week for 8 weeks  GOALS: (Goals have been discussed and agreed upon with patient.)  Discharge Goals: Time Frame: 8 weeks  1. Patient will be independent with home exercise program within 8 weeks in order to improve independence with management of patient's symptoms and/or functional deficits. (CONTINUE 1/24/18)  2. Patient will improve their foot and ankle ability measure score to 20/84 within 8 weeks in order to improve pain free independence with functional mobility. (CONTINUE 1/24/18)  3. Patient will ambulate with equal weight bearing with use of least restrictive device within 8 weeks in order to improve pain free independence with functional mobility. (CONTINUE 1/24/18)  Rehabilitation Potential For Stated Goals: Good              The information in this section was collected on 1/2/18 (except where otherwise noted). HISTORY:   History of Present Injury/Illness (Reason for Referral):  Ms. Salome Do presents with increased pain right foot and difficulty walking status post tarsal tunnel decompression on 12/7/17 with the [facility]. She had an ankle injury in April 2017 that left her unable to bear weight through her right lower extremity. [address] gave her a preliminary diagnosis of CRPS but then performed tarsal tunnel decompression right. She is cleared for weight bearing as tolerated status post procedure. She currently uses a knee scooter to get around. She holds her foot in dorsiflexed and inverted position. She does not like to touch her foot to floor. 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 Current Medications: vuvanse sonata, birth control, advil, tylenol    Date Last Reviewed:  3/6/2018   Number of Personal Factors/Comorbidities that affect the Plan of Care: 1-2: MODERATE COMPLEXITY   EXAMINATION:   Observation/Orthostatic Postural Assessment:  Assessed 1/24/18 Marbella Barahona sits with forward head and rounded shoulders which indicate tight anterior chest musculature, upper trapezius, and levator scapula and weak posterior scapula musculature and deep cervical flexors. She holds her foot in dorsiflexed and inverted position - decreased guarded positioning.  She does not touch foot to floor when sitting  Palpation:  Assessed 1/2/18         Marbella Barahona notes tenderness with palpation right plantar fascia, decreased myofascial mobility right foot/ankle and surrounding incision, increased edema right ankle/foot  ROM:  Assessed 1/24/18  Left ankle dorsiflexion active range of motion with knee flexed: 10 degrees  Left ankle dorsiflexion active range of motion with knee extended: 10 degrees  Left ankle dorsiflexion passive range of motion with knee extended: 20 degrees  Left ankle plantarflexion active range of motion: 35 degrees    Right ankle dorsiflexion active range of motion with knee flexed: 10 degrees  Rightt ankle dorsiflexion active range of motion with knee extended: 10 degrees  Right ankle dorsiflexion passive range of motion with knee extended: 15 degrees  Rightt ankle plantarflexion active range of motion: 30 degrees  At least 30 degrees of ankle inversion/eversion passive range of motion    Right first ray dorsiflexed with hypomobility plantarflexion   Strength:  Assessed 1/24/18         At least 3/5 strength right ankle - will continue to assess as appropriate  4+/5 left ankle and lower extremity all major muscle groups  Special Tests:    Neurological Screen: Assessed 1/2/18        Myotomes:  Strong left lower extremity, weak right ankle/foot due to recent procedure        Dermatomes: Altered sensation right foot/ankle        Reflexes:    Functional Mobility: Assessed 1/24/18 Rohini Vyas is modified independent with functional mobility and activities of daily living with use of scooter, she notes she has tried to shift weight to right lower extremity with use of rolling walker but has difficulty - still not attempting to ambulate with assistive device   Balance:  Assessed 1/2/17         Poor standing dynamic balance - fair standing dynamic balance with use of scooter  Mental Status:  Assessed 1/2/17        Alert and oriented x 4   Body Structures Involved:  1. Nerves  2. Bones  3. Joints  4. Muscles  5. Ligaments Body Functions Affected:  1. Sensory/Pain  2. Neuromusculoskeletal  3. Movement Related  4. Skin Related Activities and Participation Affected:  1. General Tasks and Demands  2. Mobility  3. Self Care  4. Domestic Life  5. Interpersonal Interactions and Relationships  6.  Community, Social and [place] Number of elements that affect the Plan of Care: 3: MODERATE COMPLEXITY   CLINICAL PRESENTATION:   Presentation: Evolving clinical presentation with changing clinical characteristics: MODERATE COMPLEXITY   CLINICAL DECISION MAKING:   Outcome Measure: Assessed 1/24/18  Tool Used: PT/OT FOOT AND ANKLE ABILITY MEASURE  Score:  Initial: 1/84 3/84   Interpretation of Score: For the \"Activities of Daily Living\", there are 21 questions each scored on a 5 point scale with 0 representing \"Unable to do\" and 4 representing \"No difficulty\". The lower the score, the greater the functional disability. 84/84 represents no disability. Minimal detectable change is 5.7 points. With the addition of the 8 questions in the \"Sports Subscale,\" there are 29 questions, each scored on a 5 point scale with 0 representing \"Unable to do\" and 4 representing \"No difficulty\". The lower the score, the greater the functional disability. 116/116 represents no disability. Minimal detectable change is 12.3 points. Activities of Daily Living:  Score 84 83-68 67-51 50-34 33-18 17-1 0   Modifier CH CI CJ CK CL CM CN     Medical Necessity:   · Patient is expected to demonstrate progress in strength, range of motion, balance, coordination and functional technique to increase independence with pain free functional mobility. · Patient demonstrates good rehab potential due to higher previous functional level. Reason for Services/Other Comments:  · Patient continues to require skilled intervention due to decreased independence with functional mobility. Use of outcome tool(s) and clinical judgement create a POC that gives a: Questionable prediction of patient's progress: MODERATE COMPLEXITY            TREATMENT:   (In addition to Assessment/Re-Assessment sessions the following treatments were rendered)  Pre-treatment Symptoms/Complaints:  Cuate Vargas notes pain to be 7-8/10 this morning. She reports she needs more to do for pool at home. Pain: Initial:     7-8/10     Aquatic Therapy ( 45 minutes): Aquatic treatment performed per flow grid for Decreased muscle strength, Decreased endurance and Decreased activity endurance. Cues provided for alignment and weight bearing. .  Assistance by therapist provided for patient to be able to weight bear on R LE.     Aquatic Exercise Log       Date  1-23-18 1-25-18 1-30-18 2-1-18 2-6-18 2-27-18 3-6-18   Activity/ Exercise Parameters     Patient in and out of pool by steps  Patient in and out by steps today   Walking forward Seated on noodle - 4 laps and 6 laps with rail and HHA in 3.5 ft Seated on noodle - 4 laps   Walking with rail and HHA - 6 laps Seated on noodle - 4 laps   Walking 8 laps with rail and HHA Seated on noodle - 4 laps   Walking - 6 laps with rail and HHA - emphasis on putting weight through foot. Seated on noodle - 4 laps   Standing with rail and HHA x 6 with emphasis on weight bearing.   4 laps with rail and therapist hand 4 laps with rail and therapist    Walking backward Seated on noodle - 4 laps and 2 laps with rail and HHA  In 3.5 ft Seated on noodle - 4 laps   Walking with rail and HHA - 2 laps  Seated on noodle - 4 laps  Seated on noodle - 4 laps  Seated on noodle - 4 laps  4 laps with rail and therapist   Walking sideways Seated on noodle - wide and narrow knees - 2 laps each then 4 laps with rail in 3.5 ft Walking in shallow and deeper - 4 laps  With rail Seated on noodle - 2 positions - 4 laps each Seated on noodle - 2 laps in wide steps  2 laps in narrow steps  4 laps with rail   4 laps with rail and therapist      Marching In place x 20  In place tapping foot x 10 B           Goose Step              Tip toes        4 laps with rail       Heels              Lunges           Side step squats           LE Exercises noodle    Noodle  Noodle        Hip Flex/Ext Marching x 15 R    Marching x 15 R  X 5 L Squats on noodle 2 x 10  Squats on noodle in deep x 20    Marching x 20 B      Hip Abd/Add X 15 R    X 15 R  X 5 L        Hip IR/ER              Leg press   Against wall - both feet x 10   With R only pushing through heel - x 10   Against wall   X 10 B   X 5 R  Against wall   X 10 B  X 10 R  While pushing against therapist Red step in shallow   Step ups x 10 B  Step and over, retro step up and overs x 10 B  Lateral step ups x 10 B  Squatted lateral jumps x 10 B      Knee Flex X 15 B  Knee flexed - R quad stretch with noodle  Knee flexed - R quad stretch    X 10 B      Squats X 10       SLS on   R LE on red step   15 sec, 20 sec, 25 sec, and 30 sec       Leg Circles Deeper water x 10 each way each leg Deeper water x 10 each way each leg Deeper water x 10 each way each leg Deeper water x 10 each way each leg Deep water x 10 each way each leg  X 10 each leg each way      Step Ups Red step 2 x 10 B  Big step x 10  Big step   X 10 B Big step ups x 10  First step coming out x 10 leading with R with therapist foot under hers to encourage weight bearing Big step ups x 10 B Step ups x 20 B Big steps   x 10 B   UE Exercises              Squeeze In              Push Down              Pull Down              Bicep/Tricep           Rows/Press outs            Chi Positions              Trunk Rotation           Deep H2O/ Noodles Noodle and or rings Noodle and rings Noodle and rings Noodle and rings rings rings rings      Stabilization Core stability       Core stability      Arms only Straddle - 2 laps Straddle - 2 laps Straddle - 2 laps  Straddle -  2 laps         Legs only Straddle - 2 laps  Both - 2 laps Straddle - 2 laps   Both - 4 laps Straddle - 4 laps   Both - 2 laps  Straddle - 4 laps   Both - 2 laps  - 4 laps  Both - 2 laps Straddle - 4 laps  6 laps    [place] 2 min 2.5 min 2.5 min  3 min 3 min X 25      Scissors 2 min  Combo x 2 min 2.5 min  Combo   2.5 min 2.5 min  3 min  Combo - 3 min   3 min  Combo   3 min X 25  Combo x 25      Crab walk 4 laps 4 laps  4 laps  4 laps   4 laps    Lower abdominal   work  Knees to chest x 10   Knees to chest   2 x 10  SLS activities with lots of encouragement to do task. SLS activities  Holding core tight 5 x 10 sec hold on R LE       Cardio     Weight shifting to SLS to each side x 10          Jogging 2 min in deeper water  In deep - 3 min  In deep - jog and punch - 3 minutes  X 25   Lap   Swimming              Stretches  on bench  On bench  On bench  At wall  On bench  On bench       Hamstrings X 3 B X 3 B X 3 B X 3 B X 3 B  X 3 B      Heelcords X 3 B X 3 B X 3 B X 3 B X 3 B  X 3 B      Piriformis X 3 B X 3 B X 3 B       R foot  By therapist By therapist By therapist  By therapist                           Treatment/Session Assessment:  Patient doing more activities with weight bearing in the pool. She entered and exited pool via steps with rail and assist of therapist today. Boyfriend was present for therapy session today. Response to Treatment:  Rohini Vyas notes 7-8/10 pain following therapy session. Pain: Post Treatment Session: 7/10  · Compliance with Program/Exercises: Will assess as treatment progresses. · Recommendations/Intent for next treatment session: \"Next visit will focus on advancements to more challenging activities and aquatic therapy for improved weight bearing right lower extremity\".   Total Treatment Duration: 45 minutes    PT Patient Time In/Time Out  Time In: 1075  Time Out: [place] [address] [place] [address]

## 2018-03-07 ENCOUNTER — HOSPITAL ENCOUNTER (OUTPATIENT)
Dept: PHYSICAL THERAPY | Age: 33
Discharge: HOME | End: 2018-03-07
Payer: COMMERCIAL

## 2018-03-07 PROCEDURE — 97110 THERAPEUTIC EXERCISES: CPT

## 2018-03-07 PROCEDURE — 97140 MANUAL THERAPY 1/> REGIONS: CPT

## 2018-03-07 NOTE — THERAPY RECERTIFICATION
Rohini Lilliam  : 1985 Orhini Lilliam  : 1985  Payor: Julia Parikh / Plan: [facility] CROSS OF [facility] / Product Type: PPO /  [address]  at [facility]  [address] [facility], [place] [address] [address] [facility]  [phone]   [phone]              OUTPATIENT PHYSICAL THERAPY: Recertification 401    ICD-10: Treatment Diagnosis: pain in right foot (M79.671)  Difficulty in walking, not elsewhere classified (R26.2)  Precautions/Allergies:   None per Rohini Vyas   Fall Risk Score: 1 (? 5 = High Risk)  MD Orders: Evaluate and treat MEDICAL/REFERRING DIAGNOSIS:  Pain, foot, right, chronic [M79.671, G89.29]  DATE OF ONSET: date of surgery 17  REFERRING PHYSICIAN: Gabby Silverio MD; Mike Prabhakar MD  RETURN PHYSICIAN APPOINTMENT: none schedule at this time   Patient has attended 21 physical therapy session including initial evaluation. INITIAL ASSESSMENT:  Ms. Lei Beck presents with improving pain right foot and continued difficulty walking status post tarsal tunnel decompression on 17 with the [facility]. She had an ankle injury in 2017 that left her unable to bear weight through her right lower extremity. [facility] gave her a preliminary diagnosis of CRPS but then performed tarsal tunnel decompression right. She is cleared for weight bearing as tolerated status post procedure. She currently uses a knee scooter to get around in community and walking boot and rolling walker to ambulate in home. She bears minimal weight through right lower extremity when ambulating. She presents with improving range of motion right foot and ankle. She presents with decreased independence with functional mobility and activities of daily living. She would benefit from skilled physical therapy in order to restore prior level of function and prevent future impairment. PROBLEM LIST (Impacting functional limitations):  1. Decreased Strength  2.  Decreased ADL/Functional Activities  3. Decreased Transfer Abilities  4. Decreased Ambulation Ability/Technique  5. Decreased Balance  6. Increased Pain  7. Decreased Activity Tolerance  8. Decreased Pacing Skills  9. Decreased Work Simplification/Energy Conservation Techniques  10. Decreased Flexibility/Joint Mobility  11. Edema/Girth  12. Decreased [place] with Home Exercise Program INTERVENTIONS PLANNED:  1. Balance Exercise  2. Cold  3. Family Education  4. Gait Training  5. Heat  6. Home Exercise Program (HEP)  7. Manual Therapy  8. Neuromuscular Re-education/Strengthening  9. Range of Motion (ROM)  10. Therapeutic Activites  11. Therapeutic Exercise/Strengthening  12. Transcutaneous Electrical Nerve Stimulation (TENS)  13. Transfer Training  14. Ultrasound (US)  15. orthotic management and training   TREATMENT PLAN:  Effective Dates: 1/2/18 TO 4/2/18. Frequency/Duration: at least 2 times a week for additional 8 weeks  GOALS: (Goals have been discussed and agreed upon with patient.)  Discharge Goals: Time Frame: 8 weeks  1. Patient will be independent with home exercise program within 8 weeks in order to improve independence with management of patient's symptoms and/or functional deficits. (CONTINUE 1/24/18) (MET with CURRENT HEP 3/6/18)  2. Patient will improve their foot and ankle ability measure score to 20/84 within 8 weeks in order to improve pain free independence with functional mobility. (CONTINUE 1/24/18) (CONTINUE 3/6/18)  3. Patient will ambulate with equal weight bearing with use of least restrictive device within 8 weeks in order to improve pain free independence with functional mobility. (CONTINUE 1/24/18) (CONTINUE 3/6/18)  Rehabilitation Potential For Stated Goals: Good  Regarding Alice Centeno's therapy, I certify that the treatment plan above will be carried out by a therapist or under their direction.   Thank you for this referral,  Kelsi Cazares, PT     Referring Physician Signature:          Date The information in this section was collected on 1/2/18 (except where otherwise noted). HISTORY:   History of Present Injury/Illness (Reason for Referral):  Ms. Trista Jung presents with increased pain right foot and difficulty walking status post tarsal tunnel decompression on 12/7/17 with the [facility]. She had an ankle injury in April 2017 that left her unable to bear weight through her right lower extremity. [facility] gave her a preliminary diagnosis of CRPS but then performed tarsal tunnel decompression right. She is cleared for weight bearing as tolerated status post procedure. She currently uses a knee scooter to get around. She holds her foot in dorsiflexed and inverted position. She does not like to touch her foot to floor. 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 Current Medications: vuvanse sonata, birth control, advil, tylenol    Date Last Reviewed:  3/7/2018   Number of Personal Factors/Comorbidities that affect the Plan of Care: 1-2: MODERATE COMPLEXITY   EXAMINATION:   Observation/Orthostatic Postural Assessment:  Assessed 3/6/18 Coby Majano sits with forward head and rounded shoulders which indicate tight anterior chest musculature, upper trapezius, and levator scapula and weak posterior scapula musculature and deep cervical flexors.  Decreased tone right foot and ankle  Palpation:  Assessed 1/2/18         Coby Majano notes tenderness with palpation right plantar fascia, decreased myofascial mobility right foot/ankle and surrounding incision, increased edema right ankle/foot  ROM:  Assessed 3/6/18  Left ankle dorsiflexion active range of motion with knee flexed: 10 degrees  Left ankle dorsiflexion active range of motion with knee extended: 10 degrees  Left ankle dorsiflexion passive range of motion with knee extended: 20 degrees  Left ankle plantarflexion active range of motion: 35 degrees    Right ankle dorsiflexion active range of motion with knee flexed: 20 degrees  Rightt ankle dorsiflexion active range of motion with knee extended: 15 degrees  Right ankle dorsiflexion passive range of motion with knee extended: 20 degrees  Right ankle plantarflexion active range of motion: 30 degrees  At least 30 degrees of ankle inversion/eversion passive range of motion    Right first ray dorsiflexed with hypomobility plantarflexion   Strength:  Assessed 3/6/18  4-/5 right ankle MMT but decreased functional strength and use of right lower extremity   4+/5 left ankle and lower extremity all major muscle groups  Special Tests:    Neurological Screen: Assessed 1/2/18        Myotomes:  Strong left lower extremity, weak right ankle/foot due to recent procedure        Dermatomes: Altered sensation right foot/ankle        Reflexes:    Functional Mobility: Assessed 3/6/18 Anna Byrne uses scooter in community and when at work, she ambulates with walking boot right and rolling walker with minimal weightbearing through right lower extremity   Balance:  Assessed 3/6/18       Fair standing dynamic balance with upper extremity support  Mental Status:  Assessed 1/2/17        Alert and oriented x 4   Body Structures Involved:  1. Nerves  2. Bones  3. Joints  4. Muscles  5. Ligaments Body Functions Affected:  1. Sensory/Pain  2. Neuromusculoskeletal  3. Movement Related  4. Skin Related Activities and Participation Affected:  1. General Tasks and Demands  2. Mobility  3. Self Care  4.  Domestic Life  5. Interpersonal Interactions and Relationships  6. Community, Social and [place]   Number of elements that affect the Plan of Care: 3: MODERATE COMPLEXITY   CLINICAL PRESENTATION:   Presentation: Evolving clinical presentation with changing clinical characteristics: MODERATE COMPLEXITY   CLINICAL DECISION MAKING:   Outcome Measure: Assessed 3/6/18  Tool Used: PT/OT FOOT AND ANKLE ABILITY MEASURE  Score:  Initial: 1/84 3/84 9/84   Interpretation of Score: For the \"Activities of Daily Living\", there are 21 questions each scored on a 5 point scale with 0 representing \"Unable to do\" and 4 representing \"No difficulty\". The lower the score, the greater the functional disability. 84/84 represents no disability. Minimal detectable change is 5.7 points. With the addition of the 8 questions in the \"Sports Subscale,\" there are 29 questions, each scored on a 5 point scale with 0 representing \"Unable to do\" and 4 representing \"No difficulty\". The lower the score, the greater the functional disability. 116/116 represents no disability. Minimal detectable change is 12.3 points. Activities of Daily Living:  Score 84 83-68 67-51 50-34 33-18 17-1 0   Modifier CH CI CJ CK CL CM CN     Medical Necessity:   · Patient is expected to demonstrate progress in strength, range of motion, balance, coordination and functional technique to increase independence with pain free functional mobility. · Patient demonstrates good rehab potential due to higher previous functional level. Reason for Services/Other Comments:  · Patient continues to require skilled intervention due to decreased independence with functional mobility.    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 Required minimal visual and verbal cues to promote proper body alignment and promote proper body posture. Progressed range and complexity of movement as indicated. 1. Gentle active range of motion right for improved mobility and strength - ABC, dorsiflexion/plantarflexion, inversion/eversion, circles Not performed this date     2. Gentle passive dorsiflexion stretching with towel with foot inverted to reduce tibial nerve tension 3 sets 30 seconds right    3. Tibial nerve gliding technique with foot plantarflexed and inverted and short arc quads 20 repetitions right Not performed this date     4. Gentle weight bearing dorsiflexion stretch in sitting with incline board and foot inverted 3 sets 30 seconds right Not performed this date     5. NuStep for close chain dynamic mobility right ankle/foot level 3 10 minutes - walking boot on but no upper extremity support    6. Ankle strengthening with red theraband resistance - dorsiflexion, plantarflexion, eversion, inversion 20 repetitions Not performed this date     7. Standing in parallel bars with attempting to put weight through right lower extremity 2 minutes - on force plate with balance master this date and use of rolling walker  30% weight bearing right lower extremity x 5 reps, boot donned with 50% weightbearing right lower extremity 5 repetitions with lifting left hand from walker    8. Ambulation with rolling walker with cues for equal step lengths 250 feet - dons walking boot right lower extremity     9.  Sitting on jada disc with right foot on floor and left LAQ and marching and hamstring curls with red theraband resistance (bilaterally) for weightbearing through right lower extremity and dynamic stability right ankle Not performed this date     10. Sitting heel/toe raises and inversion for improved strength and mobility 20 repetitions each bilaterally - addition of pushing in to therapist hand for plantarflexion strength Not performed this date      11. Standing with right foot on floor with towel under heel and use of rolling walker with quickly lifting left lower extremity off floor 5 repetitions for improved weight bearing over right lower extremity - standing with boot donned throwing ball with therapist 5 minutes, standing with boot donned and tapping left foot on small cone with right upper extremity support 10 repetitions     12. Leg press 10 pounds right lower extremity only with boot donned 20 repetitions     Manual Therapy (    Soft Tissue Mobilization Duration  Duration: 15 Minutes): Myofascial release to right foot and ankle for improved mobility, decreased sensitivity and decreased pain     Therapeutic Modalities:                                                                                                 Treatment/Session Assessment:  . Response to Treatment:  Mark More with good progress with weightbearing activities right lower extremity this date  Pain: Post Treatment Session: 8/10  · Compliance with Program/Exercises: Will assess as treatment progresses. · Recommendations/Intent for next treatment session: \"Next visit will focus on advancements to more challenging activities and aquatic therapy for improved weight bearing right lower extremity\".   Total Treatment Duration:  PT Patient Time In/Time Out  Time In: 1430  Time Out: Kennedy 51, PT

## 2018-03-08 ENCOUNTER — HOSPITAL ENCOUNTER (OUTPATIENT)
Dept: PHYSICAL THERAPY | Age: 33
Discharge: HOME | End: 2018-03-08
Payer: COMMERCIAL

## 2018-03-08 PROCEDURE — 97113 AQUATIC THERAPY/EXERCISES: CPT

## 2018-03-08 NOTE — THERAPY RECERTIFICATION
Coby Sell  : 1985 Coby Sell  : 1985  Payor: Harvey Myers / Plan: [place] [address] OF [address] / Product Type: PPO /  [address]  at [facility]  [address], [address], [place] [address] [address] [facility]  [phone]   [phone]              OUTPATIENT PHYSICAL THERAPY: Recertification     ICD-10: Treatment Diagnosis: pain in right foot (M79.671)  Difficulty in walking, not elsewhere classified (R26.2)  Precautions/Allergies:   None per Coby Sell   Fall Risk Score: 1 (? 5 = High Risk)  MD Orders: Evaluate and treat MEDICAL/REFERRING DIAGNOSIS:  Pain, foot, right, chronic [M79.671, G89.29]  DATE OF ONSET: date of surgery 17  REFERRING PHYSICIAN: Jonny Lazo MD; Haley Valencia MD  RETURN PHYSICIAN APPOINTMENT: none schedule at this time   Patient has attended 21 physical therapy session including initial evaluation. INITIAL ASSESSMENT:  Ms. Trista Jung presents with improving pain right foot and continued difficulty walking status post tarsal tunnel decompression on 17 with the [facility]. She had an ankle injury in 2017 that left her unable to bear weight through her right lower extremity. [facility] gave her a preliminary diagnosis of CRPS but then performed tarsal tunnel decompression right. She is cleared for weight bearing as tolerated status post procedure. She currently uses a knee scooter to get around in community and walking boot and rolling walker to ambulate in home. She bears minimal weight through right lower extremity when ambulating. She presents with improving range of motion right foot and ankle. She presents with decreased independence with functional mobility and activities of daily living. She would benefit from skilled physical therapy in order to restore prior level of function and prevent future impairment. PROBLEM LIST (Impacting functional limitations):  1. Decreased Strength  2.  Decreased ADL/Functional Activities  3. Decreased Transfer Abilities  4. Decreased Ambulation Ability/Technique  5. Decreased Balance  6. Increased Pain  7. Decreased Activity Tolerance  8. Decreased Pacing Skills  9. Decreased Work Simplification/Energy Conservation Techniques  10. Decreased Flexibility/Joint Mobility  11. Edema/Girth  12. Decreased [place] with Home Exercise Program INTERVENTIONS PLANNED:  1. Balance Exercise  2. Cold  3. Family Education  4. Gait Training  5. Heat  6. Home Exercise Program (HEP)  7. Manual Therapy  8. Neuromuscular Re-education/Strengthening  9. Range of Motion (ROM)  10. Therapeutic Activites  11. Therapeutic Exercise/Strengthening  12. Transcutaneous Electrical Nerve Stimulation (TENS)  13. Transfer Training  14. Ultrasound (US)  15. orthotic management and training   TREATMENT PLAN:  Effective Dates: 1/2/18 TO 4/2/18. Frequency/Duration: at least 2 times a week for additional 8 weeks  GOALS: (Goals have been discussed and agreed upon with patient.)  Discharge Goals: Time Frame: 8 weeks  1. Patient will be independent with home exercise program within 8 weeks in order to improve independence with management of patient's symptoms and/or functional deficits. (CONTINUE 1/24/18) (MET with CURRENT HEP 3/6/18)  2. Patient will improve their foot and ankle ability measure score to 20/84 within 8 weeks in order to improve pain free independence with functional mobility. (CONTINUE 1/24/18) (CONTINUE 3/6/18)  3. Patient will ambulate with equal weight bearing with use of least restrictive device within 8 weeks in order to improve pain free independence with functional mobility. (CONTINUE 1/24/18) (CONTINUE 3/6/18)  Rehabilitation Potential For Stated Goals: Good  Regarding Dillon Centeno's therapy, I certify that the treatment plan above will be carried out by a therapist or under their direction.   Thank you for this referral,  Tracy Shelley PTA     Referring Physician Signature:          Date The information in this section was collected on 1/2/18 (except where otherwise noted). HISTORY:   History of Present Injury/Illness (Reason for Referral):  Ms. Lei Beck presents with increased pain right foot and difficulty walking status post tarsal tunnel decompression on 12/7/17 with the [facility]. She had an ankle injury in April 2017 that left her unable to bear weight through her right lower extremity. [facility] gave her a preliminary diagnosis of CRPS but then performed tarsal tunnel decompression right. She is cleared for weight bearing as tolerated status post procedure. She currently uses a knee scooter to get around. She holds her foot in dorsiflexed and inverted position. She does not like to touch her foot to floor. 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 Current Medications: vuvanse sonata, birth control, advil, tylenol    Date Last Reviewed:  3/8/2018   Number of Personal Factors/Comorbidities that affect the Plan of Care: 1-2: MODERATE COMPLEXITY   EXAMINATION:   Observation/Orthostatic Postural Assessment:  Assessed 3/6/18 Rohini Vyas sits with forward head and rounded shoulders which indicate tight anterior chest musculature, upper trapezius, and levator scapula and weak posterior scapula musculature and deep cervical flexors.  Decreased tone right foot and ankle  Palpation:  Assessed 1/2/18         Rohini Vyas notes tenderness with palpation right plantar fascia, decreased myofascial mobility right foot/ankle and surrounding incision, increased edema right ankle/foot  ROM:  Assessed 3/6/18  Left ankle dorsiflexion active range of motion with knee flexed: 10 degrees  Left ankle dorsiflexion active range of motion with knee extended: 10 degrees  Left ankle dorsiflexion passive range of motion with knee extended: 20 degrees  Left ankle plantarflexion active range of motion: 35 degrees    Right ankle dorsiflexion active range of motion with knee flexed: 20 degrees  Rightt ankle dorsiflexion active range of motion with knee extended: 15 degrees  Right ankle dorsiflexion passive range of motion with knee extended: 20 degrees  Right ankle plantarflexion active range of motion: 30 degrees  At least 30 degrees of ankle inversion/eversion passive range of motion    Right first ray dorsiflexed with hypomobility plantarflexion   Strength:  Assessed 3/6/18  4-/5 right ankle MMT but decreased functional strength and use of right lower extremity   4+/5 left ankle and lower extremity all major muscle groups  Special Tests:    Neurological Screen: Assessed 1/2/18        Myotomes:  Strong left lower extremity, weak right ankle/foot due to recent procedure        Dermatomes: Altered sensation right foot/ankle        Reflexes:    Functional Mobility: Assessed 3/6/18 Claudio Riley uses scooter in community and when at work, she ambulates with walking boot right and rolling walker with minimal weightbearing through right lower extremity   Balance:  Assessed 3/6/18       Fair standing dynamic balance with upper extremity support  Mental Status:  Assessed 1/2/17        Alert and oriented x 4   Body Structures Involved:  1. Nerves  2. Bones  3. Joints  4. Muscles  5. Ligaments Body Functions Affected:  1. Sensory/Pain  2. Neuromusculoskeletal  3. Movement Related  4. Skin Related Activities and Participation Affected:  1. General Tasks and Demands  2. Mobility  3. Self Care  4.  Domestic Life  5. Interpersonal Interactions and Relationships  6. Community, Social and [place]   Number of elements that affect the Plan of Care: 3: MODERATE COMPLEXITY   CLINICAL PRESENTATION:   Presentation: Evolving clinical presentation with changing clinical characteristics: MODERATE COMPLEXITY   CLINICAL DECISION MAKING:   Outcome Measure: Assessed 3/6/18  Tool Used: PT/OT FOOT AND ANKLE ABILITY MEASURE  Score:  Initial: 1/84 3/84 9/84   Interpretation of Score: For the \"Activities of Daily Living\", there are 21 questions each scored on a 5 point scale with 0 representing \"Unable to do\" and 4 representing \"No difficulty\". The lower the score, the greater the functional disability. 84/84 represents no disability. Minimal detectable change is 5.7 points. With the addition of the 8 questions in the \"Sports Subscale,\" there are 29 questions, each scored on a 5 point scale with 0 representing \"Unable to do\" and 4 representing \"No difficulty\". The lower the score, the greater the functional disability. 116/116 represents no disability. Minimal detectable change is 12.3 points. Activities of Daily Living:  Score 84 83-68 67-51 50-34 33-18 17-1 0   Modifier CH CI CJ CK CL CM CN     Medical Necessity:   · Patient is expected to demonstrate progress in strength, range of motion, balance, coordination and functional technique to increase independence with pain free functional mobility. · Patient demonstrates good rehab potential due to higher previous functional level. Reason for Services/Other Comments:  · Patient continues to require skilled intervention due to decreased independence with functional mobility.    Use of outcome tool(s) and clinical judgement create a POC that gives a: Questionable prediction of patient's progress: MODERATE COMPLEXITY            TREATMENT:   (In addition to Assessment/Re-Assessment sessions the following treatments were rendered)  Pre-treatment Symptoms/Complaints:  Willie Oneal notes 7-8/10 pain. She presents walking with walker into pool area today with boot marko, (NO SCOOTER). She presents with boyfriend. Pain: Initial:     7-8/10. Aquatic Therapy ( 45 minutes): Aquatic treatment performed per flow grid for Decreased muscle strength, Decreased endurance and Decreased activity endurance.  Cues provided for alignment and weight bearing. Katia Boo by therapist provided for patient to be able to weight bear on R LE.      Aquatic Exercise [place]  Date  1-23-18 1-25-18 1-30-18 2-1-18 2-6-18 2-27-18 3-6-18 3-8-18   Activity/ Exercise Parameters         Patient in and out of pool by steps  Patient in and out by steps today In and out of pool by steps    Walking forward Seated on noodle - 4 laps and 6 laps with rail and HHA in 3.5 ft Seated on noodle - 4 laps   Walking with rail and HHA - 6 laps Seated on noodle - 4 laps   Walking 8 laps with rail and HHA Seated on noodle - 4 laps   Walking - 6 laps with rail and HHA - emphasis on putting weight through foot. Seated on noodle - 4 laps   Standing with rail and HHA x 6 with emphasis on weight bearing.   4 laps with rail and therapist hand 4 laps with rail and therapist  6 laps with rail and therapist    Walking backward Seated on noodle - 4 laps and 2 laps with rail and HHA  In 3.5 ft Seated on noodle - 4 laps   Walking with rail and HHA - 2 laps  Seated on noodle - 4 laps  Seated on noodle - 4 laps  Seated on noodle - 4 laps   4 laps with rail and therapist    Walking sideways Seated on noodle - wide and narrow knees - 2 laps each then 4 laps with rail in 3.5 ft Walking in shallow and deeper - 4 laps  With rail Seated on noodle - 2 positions - 4 laps each Seated on noodle - 2 laps in wide steps  2 laps in narrow steps   4 laps with rail   4 laps with rail and therapist 4 laps with rail        Marching In place x 20  In place tapping foot x 10 B                  Goose Step                       Tip toes              4 laps with rail         Heels                       Lunges                   Side step squats                   LE Exercises noodle       Noodle  Noodle    Noodle        Hip Flex/Ext Marching x 15 R       Marching x 15 R  X 5 L Squats on noodle 2 x 10  Squats on noodle in deep x 20     Marching x 20 B Marching x 10 B       Hip Abd/Add X 15 R       X 15 R  X 5 L            Hip IR/ER                       Leg press    Against wall - both feet x 10   With R only pushing through heel - x 10    Against wall   X 10 B   X 5 R   Against wall   X 10 B  X 10 R  While pushing against therapist Red step in shallow   Step ups x 10 B  Step and over, retro step up and overs x 10 B  Lateral step ups x 10 B  Squatted lateral jumps x 10 B Red step in deep x 20 B (step overs and retro step overs)       Knee Flex X 15 B   Knee flexed - R quad stretch with noodle  Knee flexed - R quad stretch      X 10 B        Squats X 10            SLS on   R LE on red step   15 sec, 20 sec, 25 sec, and 30 sec  SLS on R LE on red step 20 sec, 25 sec 30 sec, and 2 at 1 min      Leg Circles Deeper water x 10 each way each leg Deeper water x 10 each way each leg Deeper water x 10 each way each leg Deeper water x 10 each way each leg Deep water x 10 each way each leg   X 10 each leg each way X 15 each way each leg       Step Ups Red step 2 x 10 B  Big step x 10  Big step   X 10 B Big step ups x 10  First step coming out x 10 leading with R with therapist foot under hers to encourage weight bearing Big step ups x 10 B Step ups x 20 B Big steps   x 10 B Big step x 10 B   UE Exercises                       Squeeze In                Standing in shallow end on both sides on [email or web address] board throwing and catching ball x 15 each         Push Down                       Pull Down                       Bicep/Tricep                   Rows/Press outs                    Chi Positions                       Trunk Rotation                   Deep H2O/ Noodles Noodle and or rings Noodle and rings Noodle and rings Noodle and rings rings rings rings rings       Stabilization Core stability            Core stability        Arms only Straddle - 2 laps Straddle - 2 laps Straddle - 2 laps  Straddle -  2 laps              Legs only Straddle - 2 laps  Both - 2 laps Straddle - 2 laps   Both - 4 laps Straddle - 4 laps   Both - 2 laps  Straddle - 4 laps   Both - 2 laps  - 4 laps  Both - 2 laps Straddle - 4 laps  6 laps  6 laps    [place] 2 min 2.5 min 2.5 min   3 min 3 min X 25 X 25       Scissors 2 min  Combo x 2 min 2.5 min  Combo   2.5 min 2.5 min   3 min  Combo - 3 min 3 min  Combo   3 min X 25  Combo x 25 X 25  Combo x 25       Crab walk 4 laps 4 laps  4 laps  4 laps     4 laps  4 laps   Lower abdominal   work  Knees to chest x 10    Knees to chest   2 x 10  SLS activities with lots of encouragement to do task.    SLS activities  Holding core tight 5 x 10 sec hold on R LE   Objects on bottom of floor - she steps to them with R then pushes off back to center - multiple times only in front and to side        Cardio        Weight shifting to SLS to each side x 10               Jogging 2 min in deeper water   In deep - 3 min   In deep - jog and punch - 3 minutes   X 25    Lap   Swimming                       Stretches  on bench  On bench  On bench  At wall  On bench   On bench  Spoke at depth with her regarding importance of doing HEP while gone on work trip etc.        Hamstrings X 3 B X 3 B X 3 B X 3 B X 3 B   X 3 B        Heelcords X 3 B X 3 B X 3 B X 3 B X 3 B   X 3 B        Piriformis X 3 B X 3 B X 3 B            R foot  By therapist By therapist By therapist   By therapist                                     Treatment/Session Assessment:  . She reports the pool right now is good for her psyche. Response to Treatment:  Cece Streeter with good progress with increasing difficulties of activities in water environment.    Pain: Post Treatment Session: 8/10  · Compliance with Program/Exercises: Will assess as treatment progresses. · Recommendations/Intent for next treatment session: \"Next visit will focus on advancements to more challenging activities and aquatic therapy for improved weight bearing right lower extremity\".   Total Treatment Duration: 45 minutes   PT Patient Time In/Time Out  Time In: 1100  Time Out: 3960 Elias Monterroso PTA

## 2018-03-08 NOTE — PROGRESS NOTES
Cherl Alba  : 1985 Cherl Alba  : 1985  Payor: Marietta Michele / Plan: [facility] / Product Type: PPO /  [address]  at [facility]  [address], [address], [place] [address] [address] [facility]  [phone]   [phone]              OUTPATIENT PHYSICAL THERAPY: Daily Note and Aquatic Note 3/8/2018    ICD-10: Treatment Diagnosis: pain in right foot (M79.671)  Difficulty in walking, not elsewhere classified (R26.2)  Precautions/Allergies:   None per Enriqueta Dutton   Fall Risk Score: 1 (? 5 = High Risk)  MD Orders: Evaluate and treat MEDICAL/REFERRING DIAGNOSIS:  Pain, foot, right, chronic [F11.141, G89.29]  DATE OF ONSET: date of surgery 17  REFERRING PHYSICIAN: Christie Lara MD; Marcia Faustin MD  RETURN PHYSICIAN APPOINTMENT: none schedule at this time   Patient has attended 21 physical therapy session including initial evaluation. INITIAL ASSESSMENT:  Ms. Shon Wilkins presents with improving pain right foot and continued difficulty walking status post tarsal tunnel decompression on 17 with the [facility]. She had an ankle injury in 2017 that left her unable to bear weight through her right lower extremity. [facility] gave her a preliminary diagnosis of CRPS but then performed tarsal tunnel decompression right. She is cleared for weight bearing as tolerated status post procedure. She currently uses a knee scooter to get around in community and walking boot and rolling walker to ambulate in home. She bears minimal weight through right lower extremity when ambulating. She presents with improving range of motion right foot and ankle. She presents with decreased independence with functional mobility and activities of daily living. She would benefit from skilled physical therapy in order to restore prior level of function and prevent future impairment. PROBLEM LIST (Impacting functional limitations):  1. Decreased Strength  2.  Decreased ADL/Functional Activities  3. Decreased Transfer Abilities  4. Decreased Ambulation Ability/Technique  5. Decreased Balance  6. Increased Pain  7. Decreased Activity Tolerance  8. Decreased Pacing Skills  9. Decreased Work Simplification/Energy Conservation Techniques  10. Decreased Flexibility/Joint Mobility  11. Edema/Girth  12. Decreased [place] with Home Exercise Program INTERVENTIONS PLANNED:  1. Balance Exercise  2. Cold  3. Family Education  4. Gait Training  5. Heat  6. Home Exercise Program (HEP)  7. Manual Therapy  8. Neuromuscular Re-education/Strengthening  9. Range of Motion (ROM)  10. Therapeutic Activites  11. Therapeutic Exercise/Strengthening  12. Transcutaneous Electrical Nerve Stimulation (TENS)  13. Transfer Training  14. Ultrasound (US)  15. orthotic management and training   TREATMENT PLAN:  Effective Dates: 1/2/18 TO 4/2/18. Frequency/Duration: at least 2 times a week for additional 8 weeks  GOALS: (Goals have been discussed and agreed upon with patient.)  Discharge Goals: Time Frame: 8 weeks  1. Patient will be independent with home exercise program within 8 weeks in order to improve independence with management of patient's symptoms and/or functional deficits. (CONTINUE 1/24/18) (MET with CURRENT HEP 3/6/18)  2. Patient will improve their foot and ankle ability measure score to 20/84 within 8 weeks in order to improve pain free independence with functional mobility. (CONTINUE 1/24/18) (CONTINUE 3/6/18)  3. Patient will ambulate with equal weight bearing with use of least restrictive device within 8 weeks in order to improve pain free independence with functional mobility. (CONTINUE 1/24/18) (CONTINUE 3/6/18)  Rehabilitation Potential For Stated Goals: Good  Regarding Dilia Centeno's therapy, I certify that the treatment plan above will be carried out by a therapist or under their direction.   Thank you for this referral,  William Serrano PTA     Referring Physician Signature: Date                        The information in this section was collected on 1/2/18 (except where otherwise noted). HISTORY:   History of Present Injury/Illness (Reason for Referral):  Ms. Diego Hernandez presents with increased pain right foot and difficulty walking status post tarsal tunnel decompression on 12/7/17 with the [facility]. She had an ankle injury in April 2017 that left her unable to bear weight through her right lower extremity. [facility] gave her a preliminary diagnosis of CRPS but then performed tarsal tunnel decompression right. She is cleared for weight bearing as tolerated status post procedure. She currently uses a knee scooter to get around. She holds her foot in dorsiflexed and inverted position. She does not like to touch her foot to floor. 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 Current Medications: vuvanse sonata, birth control, advil, tylenol    Date Last Reviewed:  3/8/2018   Number of Personal Factors/Comorbidities that affect the Plan of Care: 1-2: MODERATE COMPLEXITY   EXAMINATION:   Observation/Orthostatic Postural Assessment:  Assessed 3/6/18 Alessia Smith sits with forward head and rounded shoulders which indicate tight anterior chest musculature, upper trapezius, and levator scapula and weak posterior scapula musculature and deep cervical flexors.  Decreased tone right foot and ankle  Palpation:  Assessed 1/2/18         Alessia Smith notes tenderness with palpation right plantar fascia, decreased myofascial mobility right foot/ankle and surrounding incision, increased edema right ankle/foot  ROM:  Assessed 3/6/18  Left ankle dorsiflexion active range of motion with knee flexed: 10 degrees  Left ankle dorsiflexion active range of motion with knee extended: 10 degrees  Left ankle dorsiflexion passive range of motion with knee extended: 20 degrees  Left ankle plantarflexion active range of motion: 35 degrees    Right ankle dorsiflexion active range of motion with knee flexed: 20 degrees  Rightt ankle dorsiflexion active range of motion with knee extended: 15 degrees  Right ankle dorsiflexion passive range of motion with knee extended: 20 degrees  Right ankle plantarflexion active range of motion: 30 degrees  At least 30 degrees of ankle inversion/eversion passive range of motion    Right first ray dorsiflexed with hypomobility plantarflexion   Strength:  Assessed 3/6/18  4-/5 right ankle MMT but decreased functional strength and use of right lower extremity   4+/5 left ankle and lower extremity all major muscle groups  Special Tests:    Neurological Screen: Assessed 1/2/18        Myotomes:  Strong left lower extremity, weak right ankle/foot due to recent procedure        Dermatomes: Altered sensation right foot/ankle        Reflexes:    Functional Mobility: Assessed 3/6/18 Abiola Roldan uses scooter in community and when at work, she ambulates with walking boot right and rolling walker with minimal weightbearing through right lower extremity   Balance:  Assessed 3/6/18       Fair standing dynamic balance with upper extremity support  Mental Status:  Assessed 1/2/17        Alert and oriented x 4   Body Structures Involved:  1. Nerves  2. Bones  3. Joints  4. Muscles  5. Ligaments Body Functions Affected:  1. Sensory/Pain  2. Neuromusculoskeletal  3. Movement Related  4. Skin Related Activities and Participation Affected:  1. General Tasks and Demands  2. Mobility  3.  Self Care  4. Domestic Life  5. Interpersonal Interactions and Relationships  6. Community, Social and [place]   Number of elements that affect the Plan of Care: 3: MODERATE COMPLEXITY   CLINICAL PRESENTATION:   Presentation: Evolving clinical presentation with changing clinical characteristics: MODERATE COMPLEXITY   CLINICAL DECISION MAKING:   Outcome Measure: Assessed 3/6/18  Tool Used: PT/OT FOOT AND ANKLE ABILITY MEASURE  Score:  Initial: 1/84 3/84 9/84   Interpretation of Score: For the \"Activities of Daily Living\", there are 21 questions each scored on a 5 point scale with 0 representing \"Unable to do\" and 4 representing \"No difficulty\". The lower the score, the greater the functional disability. 84/84 represents no disability. Minimal detectable change is 5.7 points. With the addition of the 8 questions in the \"Sports Subscale,\" there are 29 questions, each scored on a 5 point scale with 0 representing \"Unable to do\" and 4 representing \"No difficulty\". The lower the score, the greater the functional disability. 116/116 represents no disability. Minimal detectable change is 12.3 points. Activities of Daily Living:  Score 84 83-68 67-51 50-34 33-18 17-1 0   Modifier CH CI CJ CK CL CM CN     Medical Necessity:   · Patient is expected to demonstrate progress in strength, range of motion, balance, coordination and functional technique to increase independence with pain free functional mobility. · Patient demonstrates good rehab potential due to higher previous functional level. Reason for Services/Other Comments:  · Patient continues to require skilled intervention due to decreased independence with functional mobility.    Use of outcome tool(s) and clinical judgement create a POC that gives a: Questionable prediction of patient's progress: MODERATE COMPLEXITY            TREATMENT:   (In addition to Assessment/Re-Assessment sessions the following treatments were rendered)  Pre-treatment Symptoms/Complaints: Fernando Webb notes 7-8/10 pain. She presents walking with walker into pool area today with boot marko, (NO SCOOTER). She presents with boyfriend. Pain: Initial:     7-8/10. Aquatic Therapy ( 45 minutes): Aquatic treatment performed per flow grid for Decreased muscle strength, Decreased endurance and Decreased activity endurance.  Cues provided for alignment and weight bearing. Carrington Espitia by therapist provided for patient to be able to weight bear on R LE.      Aquatic Exercise Khushbu Saver Date  1-23-18 1-25-18 1-30-18 2-1-18 2-6-18 2-27-18 3-6-18 3-8-18   Activity/ Exercise Parameters         Patient in and out of pool by steps  Patient in and out by steps today In and out of pool by steps    Walking forward Seated on noodle - 4 laps and 6 laps with rail and HHA in 3.5 ft Seated on noodle - 4 laps   Walking with rail and HHA - 6 laps Seated on noodle - 4 laps   Walking 8 laps with rail and HHA Seated on noodle - 4 laps   Walking - 6 laps with rail and HHA - emphasis on putting weight through foot. Seated on noodle - 4 laps   Standing with rail and HHA x 6 with emphasis on weight bearing.   4 laps with rail and therapist hand 4 laps with rail and therapist  6 laps with rail and therapist    Walking backward Seated on noodle - 4 laps and 2 laps with rail and HHA  In 3.5 ft Seated on noodle - 4 laps   Walking with rail and HHA - 2 laps  Seated on noodle - 4 laps  Seated on noodle - 4 laps  Seated on noodle - 4 laps   4 laps with rail and therapist    Walking sideways Seated on noodle - wide and narrow knees - 2 laps each then 4 laps with rail in 3.5 ft Walking in shallow and deeper - 4 laps  With rail Seated on noodle - 2 positions - 4 laps each Seated on noodle - 2 laps in wide steps  2 laps in narrow steps   4 laps with rail   4 laps with rail and therapist 4 laps with rail        Marching In place x 20  In place tapping foot x 10 B                  Goose Step                       Tip toes              4 laps with rail         Heels                       Lunges                   Side step squats                   LE Exercises noodle       Noodle  Noodle    Noodle        Hip Flex/Ext Marching x 15 R       Marching x 15 R  X 5 L Squats on noodle 2 x 10  Squats on noodle in deep x 20     Marching x 20 B Marching x 10 B       Hip Abd/Add X 15 R       X 15 R  X 5 L            Hip IR/ER                       Leg press    Against wall - both feet x 10   With R only pushing through heel - x 10    Against wall   X 10 B   X 5 R   Against wall   X 10 B  X 10 R  While pushing against therapist Red step in shallow   Step ups x 10 B  Step and over, retro step up and overs x 10 B  Lateral step ups x 10 B  Squatted lateral jumps x 10 B Red step in deep x 20 B (step overs and retro step overs)       Knee Flex X 15 B   Knee flexed - R quad stretch with noodle  Knee flexed - R quad stretch      X 10 B        Squats X 10            SLS on   R LE on red step   15 sec, 20 sec, 25 sec, and 30 sec  SLS on R LE on red step 20 sec, 25 sec 30 sec, and 2 at 1 min      Leg Circles Deeper water x 10 each way each leg Deeper water x 10 each way each leg Deeper water x 10 each way each leg Deeper water x 10 each way each leg Deep water x 10 each way each leg   X 10 each leg each way X 15 each way each leg       Step Ups Red step 2 x 10 B  Big step x 10  Big step   X 10 B Big step ups x 10  First step coming out x 10 leading with R with therapist foot under hers to encourage weight bearing Big step ups x 10 B Step ups x 20 B Big steps   x 10 B Big step x 10 B   UE Exercises                       Squeeze In                Standing in shallow end on both sides on [employer] board throwing and catching ball x 15 each         Push Down                       Pull Down                       Bicep/Tricep                   Rows/Press outs                    Chi Positions                       Trunk Rotation                   Deep H2O/ Noodles Noodle and or rings Noodle and rings Noodle and rings Noodle and rings rings rings rings rings       Stabilization Core stability            Core stability        Arms only Straddle - 2 laps Straddle - 2 laps Straddle - 2 laps  Straddle -  2 laps              Legs only Straddle - 2 laps  Both - 2 laps Straddle - 2 laps   Both - 4 laps Straddle - 4 laps   Both - 2 laps  Straddle - 4 laps   Both - 2 laps  - 4 laps  Both - 2 laps Straddle - 4 laps  6 laps  6 laps    [place] 2 min 2.5 min 2.5 min   3 min 3 min X 25 X 25       Scissors 2 min  Combo x 2 min 2.5 min  Combo   2.5 min 2.5 min   3 min  Combo - 3 min 3 min  Combo   3 min X 25  Combo x 25 X 25  Combo x 25       Crab walk 4 laps 4 laps  4 laps  4 laps     4 laps  4 laps   Lower abdominal   work  Knees to chest x 10    Knees to chest   2 x 10  SLS activities with lots of encouragement to do task.    SLS activities  Holding core tight 5 x 10 sec hold on R LE   Objects on bottom of floor - she steps to them with R then pushes off back to center - multiple times only in front and to side        Cardio        Weight shifting to SLS to each side x 10               Jogging 2 min in deeper water   In deep - 3 min   In deep - jog and punch - 3 minutes   X 25    Lap   Swimming                       Stretches  on bench  On bench  On bench  At wall  On bench   On bench  Spoke at depth with her regarding importance of doing HEP while gone on work trip etc.        Hamstrings X 3 B X 3 B X 3 B X 3 B X 3 B   X 3 B        Heelcords X 3 B X 3 B X 3 B X 3 B X 3 B   X 3 B        Piriformis X 3 B X 3 B X 3 B            R foot  By therapist By therapist By therapist   By therapist                                     Treatment/Session Assessment:  . She reports the pool right now is good for her psyche. Response to Treatment:  Theoplis Hick with good progress with increasing difficulties of activities in water environment.    Pain: Post Treatment Session: 8/10  · Compliance with Program/Exercises: Will assess as treatment progresses. · Recommendations/Intent for next treatment session: \"Next visit will focus on advancements to more challenging activities and aquatic therapy for improved weight bearing right lower extremity\".   Total Treatment Duration: 45 minutes   PT Patient Time In/Time Out  Time In: 1100  Time Out: 3960 Elias Monterroso PTA

## 2018-03-13 ENCOUNTER — HOSPITAL ENCOUNTER (OUTPATIENT)
Dept: PHYSICAL THERAPY | Age: 33
Discharge: HOME | End: 2018-03-13
Payer: COMMERCIAL

## 2018-03-13 NOTE — PROGRESS NOTES
Dmitry Marte  : 1985  Primary: [facility]  Secondary:  [facility] at [facility] [address], [address] [facility], [address]  [phone]   [phone]      3/13/2018        Patient had called to cancel for her aquatic therapy today. Will follow up with the patient at the next visit.   Ramandeep Lees, PTA

## 2018-03-14 ENCOUNTER — HOSPITAL ENCOUNTER (OUTPATIENT)
Dept: PHYSICAL THERAPY | Age: 33
Discharge: HOME | End: 2018-03-14
Payer: COMMERCIAL

## 2018-03-14 NOTE — PROGRESS NOTES
Vasu Patel  : 1985  Primary: [facility]ma[facility]  Secondary:  Therapy Center at [facility] [address], [address] [facility], [place] [address] [address] [facility]  [phone]   [phone]        OUTPATIENT DAILY NOTE    NAME/AGE/GENDER: Vasu Patel is a 34 y.o. female. DATE: 3/14/2018    Patient canceled physical therapy appointment today due to no transportation. Will plan to follow up on next scheduled visit.     Anatoliy Jimenez, PT

## 2018-03-15 ENCOUNTER — APPOINTMENT (OUTPATIENT)
Dept: PHYSICAL THERAPY | Age: 33
End: 2018-03-15
Payer: COMMERCIAL

## 2022-08-02 ENCOUNTER — RX ONLY (RX ONLY)
Age: 37
End: 2022-08-02

## 2022-08-02 ENCOUNTER — APPOINTMENT (OUTPATIENT)
Dept: URBAN - METROPOLITAN AREA CLINIC 207 | Age: 37
Setting detail: DERMATOLOGY
End: 2022-08-11

## 2022-08-02 DIAGNOSIS — L71.8 OTHER ROSACEA: ICD-10-CM

## 2022-08-02 PROBLEM — D23.61 OTHER BENIGN NEOPLASM OF SKIN OF RIGHT UPPER LIMB, INCLUDING SHOULDER: Status: ACTIVE | Noted: 2022-08-02

## 2022-08-02 PROCEDURE — OTHER COUNSELING: OTHER

## 2022-08-02 PROCEDURE — OTHER PRESCRIPTION: OTHER

## 2022-08-02 PROCEDURE — 99203 OFFICE O/P NEW LOW 30 MIN: CPT

## 2022-08-02 RX ORDER — DOXYCYCLINE HYCLATE 20 MG/1
TABLET, FILM COATED ORAL
Qty: 60 | Refills: 2 | Status: ERX

## 2022-08-02 RX ORDER — DOXYCYCLINE HYCLATE 20 MG/1
TABLET, FILM COATED ORAL
Qty: 60 | Refills: 2 | Status: ERX | COMMUNITY
Start: 2022-08-02

## 2022-08-02 RX ORDER — BENZOYL PEROXIDE 50 MG/G
CREAM TOPICAL QHS
Qty: 30 | Refills: 3 | Status: ERX | COMMUNITY
Start: 2022-08-02

## 2022-08-02 RX ORDER — OXYMETAZOLINE HYDROCHLORIDE 1 G/100G
CREAM TOPICAL
Qty: 30 | Refills: 0 | Status: ERX | COMMUNITY
Start: 2022-08-02

## 2022-08-02 ASSESSMENT — LOCATION ZONE DERM: LOCATION ZONE: FACE

## 2022-08-02 ASSESSMENT — LOCATION SIMPLE DESCRIPTION DERM: LOCATION SIMPLE: RIGHT CHEEK

## 2022-08-02 ASSESSMENT — LOCATION DETAILED DESCRIPTION DERM: LOCATION DETAILED: RIGHT CENTRAL MALAR CHEEK

## 2022-08-02 NOTE — HPI: RASH
How Severe Is Your Rash?: mild
Is This A New Presentation, Or A Follow-Up?: Rash
Additional History: Patient denies pain,itching or bleeding.

## 2022-08-09 ENCOUNTER — APPOINTMENT (OUTPATIENT)
Dept: URBAN - METROPOLITAN AREA CLINIC 206 | Age: 37
Setting detail: DERMATOLOGY
End: 2022-08-11

## 2022-08-09 DIAGNOSIS — Z41.9 ENCOUNTER FOR PROCEDURE FOR PURPOSES OTHER THAN REMEDYING HEALTH STATE, UNSPECIFIED: ICD-10-CM

## 2022-08-09 PROCEDURE — OTHER VBEAM PERFECTA LASER: OTHER

## 2022-08-09 PROCEDURE — OTHER OTHER: OTHER

## 2022-08-09 NOTE — PROCEDURE: OTHER
Render Risk Assessment In Note?: no
Other (Free Text): 5j 3x10 spot 48 pulses\\n11j 10 spot 378 pulses
Note Text (......Xxx Chief Complaint.): This diagnosis correlates with the
Detail Level: Zone

## 2022-09-13 ENCOUNTER — APPOINTMENT (OUTPATIENT)
Dept: URBAN - METROPOLITAN AREA CLINIC 206 | Age: 37
Setting detail: DERMATOLOGY
End: 2022-09-20

## 2022-09-13 DIAGNOSIS — Z41.9 ENCOUNTER FOR PROCEDURE FOR PURPOSES OTHER THAN REMEDYING HEALTH STATE, UNSPECIFIED: ICD-10-CM

## 2022-09-13 PROCEDURE — OTHER VBEAM PERFECTA LASER: OTHER

## 2022-09-13 ASSESSMENT — LOCATION DETAILED DESCRIPTION DERM: LOCATION DETAILED: RIGHT INFERIOR MEDIAL FOREHEAD

## 2022-09-13 ASSESSMENT — LOCATION SIMPLE DESCRIPTION DERM: LOCATION SIMPLE: RIGHT FOREHEAD

## 2022-09-13 ASSESSMENT — LOCATION ZONE DERM: LOCATION ZONE: FACE

## 2022-09-13 NOTE — PROCEDURE: VBEAM PERFECTA LASER
Detail Level: Zone
Fluence (J/Cm2): 5.50
Spray Time (Ms): 0
Spot Size: 10 mm
Is There A Third Setting?: no
Consent: Written consent obtained.  Risks were reviewed including but not limited to crusting, scabbing, blistering, scarring, darker or lighter pigmentary change, bruising, and/or incomplete response.
Fluence (J/Cm2): 11.25
Spot Size: 3x10 mm
Price (Use Numbers Only, No Special Characters Or $): 350.00
Post-Care Instructions: I reviewed with the patient in detail post-care instructions.  Cool compresses or cold gel packs may be applied after treatment.  Exposure to the sun should be avoided and sun protection and sunscreen should be used.
Pre-Procedure: The treatment areas identified by the patient were cleansed and treatment was performed with the parameters mentioned above.
Pulse Duration: 10 ms
Number Of Pulses: 313
Post-Procedure: Following the procedure the post care instructions were reviewed with the patient.
Number Of Pulses: 44
Treatment Number: 1

## 2022-10-18 ENCOUNTER — APPOINTMENT (OUTPATIENT)
Dept: URBAN - METROPOLITAN AREA CLINIC 206 | Age: 37
Setting detail: DERMATOLOGY
End: 2022-10-20

## 2022-10-18 DIAGNOSIS — Z41.9 ENCOUNTER FOR PROCEDURE FOR PURPOSES OTHER THAN REMEDYING HEALTH STATE, UNSPECIFIED: ICD-10-CM

## 2022-10-18 PROCEDURE — OTHER VBEAM PERFECTA LASER: OTHER

## 2022-10-18 PROCEDURE — OTHER OTHER: OTHER

## 2022-10-18 ASSESSMENT — LOCATION SIMPLE DESCRIPTION DERM: LOCATION SIMPLE: INFERIOR FOREHEAD

## 2022-10-18 ASSESSMENT — LOCATION DETAILED DESCRIPTION DERM: LOCATION DETAILED: INFERIOR MID FOREHEAD

## 2022-10-18 ASSESSMENT — LOCATION ZONE DERM: LOCATION ZONE: FACE

## 2022-10-18 NOTE — PROCEDURE: VBEAM PERFECTA LASER
Price (Use Numbers Only, No Special Characters Or $): 866 Price (Use Numbers Only, No Special Characters Or $): 088

## 2022-10-18 NOTE — PROCEDURE: OTHER
Note Text (......Xxx Chief Complaint.): This diagnosis correlates with the
Other (Free Text): 3rd vbeam treatment, patient states that she can definitely see an improvement in the \\nredness however because of her travel she have not been consistent with following her skin care regiment. She has an appointment with [facility] and will make additional appointments after her appointment with [facility].\\n5j, 10mm, 10ms 403 pulses entire face\\n3x10 mm, 10ms, 11j 26 pulses
Detail Level: Zone
Render Risk Assessment In Note?: no

## 2022-11-02 ENCOUNTER — APPOINTMENT (OUTPATIENT)
Dept: URBAN - METROPOLITAN AREA CLINIC 207 | Age: 37
Setting detail: DERMATOLOGY
End: 2022-11-09

## 2022-11-02 DIAGNOSIS — L71.8 OTHER ROSACEA: ICD-10-CM

## 2022-11-02 PROCEDURE — OTHER PRESCRIPTION MEDICATION MANAGEMENT: OTHER

## 2022-11-02 PROCEDURE — OTHER PRESCRIPTION: OTHER

## 2022-11-02 PROCEDURE — 99214 OFFICE O/P EST MOD 30 MIN: CPT

## 2022-11-02 PROCEDURE — OTHER MEDICATION COUNSELING: OTHER

## 2022-11-02 PROCEDURE — OTHER COUNSELING: OTHER

## 2022-11-02 RX ORDER — DOXYCYCLINE HYCLATE 20 MG/1
TABLET, FILM COATED ORAL
Qty: 60 | Refills: 11 | Status: ERX

## 2022-11-02 ASSESSMENT — LOCATION SIMPLE DESCRIPTION DERM: LOCATION SIMPLE: INFERIOR FOREHEAD

## 2022-11-02 ASSESSMENT — LOCATION ZONE DERM: LOCATION ZONE: FACE

## 2022-11-02 ASSESSMENT — LOCATION DETAILED DESCRIPTION DERM: LOCATION DETAILED: INFERIOR MID FOREHEAD

## 2022-11-02 NOTE — PROCEDURE: MEDICATION COUNSELING
Melolabial Transposition Flap Text: The defect edges were debeveled with a #15 scalpel blade.  Given the location of the defect and the proximity to free margins a melolabial flap was deemed most appropriate.  Using a sterile surgical marker, an appropriate melolabial transposition flap was drawn incorporating the defect.    The area thus outlined was incised deep to adipose tissue with a #15 scalpel blade.  The skin margins were undermined to an appropriate distance in all directions utilizing iris scissors. Adbry Pregnancy And Lactation Text: It is unknown if this medication will adversely affect pregnancy or breast feeding.

## 2022-11-02 NOTE — PROCEDURE: PRESCRIPTION MEDICATION MANAGEMENT
Detail Level: Zone
Initiate Treatment: Improvement with three v-beam laser treatments\\nStart previously prescribed topicals\\nRefills good for one year\\n\\nRX refill: Doxycycline 20mg - take 2 pills together QD with a meal and water. Stay upright 30 mins after\\nApply thin layer of Rhofade on face every morning as needed \\nApply thin layer of Epsolay on face every night as tolerable\\n\\nContinue sensitive skin protocol \\nSunscreen SPF 30+ daily\\nDiscontinue medications if become pregnant
Render In Strict Bullet Format?: No

## 2022-11-02 NOTE — PROCEDURE: MEDICATION COUNSELING
tachycardic Methotrexate Pregnancy And Lactation Text: This medication is Pregnancy Category X and is known to cause fetal harm. This medication is excreted in breast milk.

## 2022-11-02 NOTE — HPI: RASH (ROSACEA)
Is This A New Presentation, Or A Follow-Up?: Follow Up Rosacea
Additional History: Patient discontinued taking Doxy 40mg QD and has not started topicals yet due to traveling.

## 2022-11-02 NOTE — PROCEDURE: MEDICATION COUNSELING
Xelieshaz Pregnancy And Lactation Text: This medication is Pregnancy Category D and is not considered safe during pregnancy.  The risk during breast feeding is also uncertain.

## 2023-05-13 NOTE — PROCEDURE: VBEAM PERFECTA LASER
Detail Level: Zone
Location: face
Treatment Number: 1
Spray Time (Ms): 0
Is There A Second Setting?: no
Pre-Procedure: The treatment areas identified by the patient were cleansed and treatment was performed with the parameters mentioned above.
Post-Procedure: Following the procedure the post care instructions were reviewed with the patient.
Consent: Written consent obtained.  Risks were reviewed including but not limited to crusting, scabbing, blistering, scarring, darker or lighter pigmentary change, bruising, and/or incomplete response.
Post-Care Instructions: I reviewed with the patient in detail post-care instructions.  Cool compresses or cold gel packs may be applied after treatment.  Exposure to the sun should be avoided and sun protection and sunscreen should be used.
0 = understands/communicates without difficulty